# Patient Record
Sex: MALE | Race: BLACK OR AFRICAN AMERICAN | NOT HISPANIC OR LATINO | URBAN - METROPOLITAN AREA
[De-identification: names, ages, dates, MRNs, and addresses within clinical notes are randomized per-mention and may not be internally consistent; named-entity substitution may affect disease eponyms.]

---

## 2017-08-31 ENCOUNTER — OUTPATIENT (OUTPATIENT)
Dept: OUTPATIENT SERVICES | Facility: HOSPITAL | Age: 77
LOS: 1 days | Discharge: HOME | End: 2017-08-31

## 2017-09-07 DIAGNOSIS — I10 ESSENTIAL (PRIMARY) HYPERTENSION: ICD-10-CM

## 2017-09-07 DIAGNOSIS — H27.121 ANTERIOR DISLOCATION OF LENS, RIGHT EYE: ICD-10-CM

## 2017-09-07 DIAGNOSIS — M19.90 UNSPECIFIED OSTEOARTHRITIS, UNSPECIFIED SITE: ICD-10-CM

## 2017-09-07 DIAGNOSIS — N40.0 BENIGN PROSTATIC HYPERPLASIA WITHOUT LOWER URINARY TRACT SYMPTOMS: ICD-10-CM

## 2023-07-08 ENCOUNTER — INPATIENT (INPATIENT)
Facility: HOSPITAL | Age: 83
LOS: 5 days | Discharge: NURSING, REHAB OR LONG-TERM CARE | DRG: 377 | End: 2023-07-14
Attending: INTERNAL MEDICINE | Admitting: INTERNAL MEDICINE
Payer: MEDICARE

## 2023-07-08 VITALS
DIASTOLIC BLOOD PRESSURE: 57 MMHG | OXYGEN SATURATION: 96 % | HEIGHT: 68 IN | RESPIRATION RATE: 17 BRPM | WEIGHT: 184.97 LBS | HEART RATE: 85 BPM | SYSTOLIC BLOOD PRESSURE: 91 MMHG | TEMPERATURE: 100 F

## 2023-07-08 DIAGNOSIS — I10 ESSENTIAL (PRIMARY) HYPERTENSION: ICD-10-CM

## 2023-07-08 DIAGNOSIS — N40.0 BENIGN PROSTATIC HYPERPLASIA WITHOUT LOWER URINARY TRACT SYMPTOMS: ICD-10-CM

## 2023-07-08 DIAGNOSIS — H40.9 UNSPECIFIED GLAUCOMA: ICD-10-CM

## 2023-07-08 DIAGNOSIS — E53.8 DEFICIENCY OF OTHER SPECIFIED B GROUP VITAMINS: ICD-10-CM

## 2023-07-08 DIAGNOSIS — I25.10 ATHEROSCLEROTIC HEART DISEASE OF NATIVE CORONARY ARTERY WITHOUT ANGINA PECTORIS: ICD-10-CM

## 2023-07-08 DIAGNOSIS — Z29.9 ENCOUNTER FOR PROPHYLACTIC MEASURES, UNSPECIFIED: ICD-10-CM

## 2023-07-08 DIAGNOSIS — D64.9 ANEMIA, UNSPECIFIED: ICD-10-CM

## 2023-07-08 DIAGNOSIS — E11.9 TYPE 2 DIABETES MELLITUS WITHOUT COMPLICATIONS: ICD-10-CM

## 2023-07-08 DIAGNOSIS — K92.2 GASTROINTESTINAL HEMORRHAGE, UNSPECIFIED: ICD-10-CM

## 2023-07-08 DIAGNOSIS — E78.5 HYPERLIPIDEMIA, UNSPECIFIED: ICD-10-CM

## 2023-07-08 LAB
ALBUMIN SERPL ELPH-MCNC: 2.9 G/DL — LOW (ref 3.5–5)
ALP SERPL-CCNC: 92 U/L — SIGNIFICANT CHANGE UP (ref 40–120)
ALT FLD-CCNC: 27 U/L DA — SIGNIFICANT CHANGE UP (ref 10–60)
ANION GAP SERPL CALC-SCNC: 7 MMOL/L — SIGNIFICANT CHANGE UP (ref 5–17)
APTT BLD: 27.4 SEC — LOW (ref 27.5–35.5)
AST SERPL-CCNC: 29 U/L — SIGNIFICANT CHANGE UP (ref 10–40)
BASOPHILS # BLD AUTO: 0.03 K/UL — SIGNIFICANT CHANGE UP (ref 0–0.2)
BASOPHILS NFR BLD AUTO: 0.4 % — SIGNIFICANT CHANGE UP (ref 0–2)
BILIRUB SERPL-MCNC: 0.3 MG/DL — SIGNIFICANT CHANGE UP (ref 0.2–1.2)
BLD GP AB SCN SERPL QL: SIGNIFICANT CHANGE UP
BUN SERPL-MCNC: 24 MG/DL — HIGH (ref 7–18)
CALCIUM SERPL-MCNC: 9.3 MG/DL — SIGNIFICANT CHANGE UP (ref 8.4–10.5)
CHLORIDE SERPL-SCNC: 100 MMOL/L — SIGNIFICANT CHANGE UP (ref 96–108)
CO2 SERPL-SCNC: 29 MMOL/L — SIGNIFICANT CHANGE UP (ref 22–31)
CREAT SERPL-MCNC: 0.81 MG/DL — SIGNIFICANT CHANGE UP (ref 0.5–1.3)
EGFR: 87 ML/MIN/1.73M2 — SIGNIFICANT CHANGE UP
EOSINOPHIL # BLD AUTO: 0.11 K/UL — SIGNIFICANT CHANGE UP (ref 0–0.5)
EOSINOPHIL NFR BLD AUTO: 1.5 % — SIGNIFICANT CHANGE UP (ref 0–6)
GLUCOSE BLDC GLUCOMTR-MCNC: 119 MG/DL — HIGH (ref 70–99)
GLUCOSE BLDC GLUCOMTR-MCNC: 134 MG/DL — HIGH (ref 70–99)
GLUCOSE SERPL-MCNC: 109 MG/DL — HIGH (ref 70–99)
HCT VFR BLD CALC: 26 % — LOW (ref 39–50)
HCT VFR BLD CALC: 26.2 % — LOW (ref 39–50)
HCT VFR BLD CALC: 26.8 % — LOW (ref 39–50)
HGB BLD-MCNC: 8 G/DL — LOW (ref 13–17)
HGB BLD-MCNC: 8.2 G/DL — LOW (ref 13–17)
HGB BLD-MCNC: 8.4 G/DL — LOW (ref 13–17)
IMM GRANULOCYTES NFR BLD AUTO: 0.6 % — SIGNIFICANT CHANGE UP (ref 0–0.9)
INR BLD: 0.98 RATIO — SIGNIFICANT CHANGE UP (ref 0.88–1.16)
LYMPHOCYTES # BLD AUTO: 1.59 K/UL — SIGNIFICANT CHANGE UP (ref 1–3.3)
LYMPHOCYTES # BLD AUTO: 22 % — SIGNIFICANT CHANGE UP (ref 13–44)
MCHC RBC-ENTMCNC: 27.5 PG — SIGNIFICANT CHANGE UP (ref 27–34)
MCHC RBC-ENTMCNC: 27.8 PG — SIGNIFICANT CHANGE UP (ref 27–34)
MCHC RBC-ENTMCNC: 28.3 PG — SIGNIFICANT CHANGE UP (ref 27–34)
MCHC RBC-ENTMCNC: 30.8 GM/DL — LOW (ref 32–36)
MCHC RBC-ENTMCNC: 31.3 GM/DL — LOW (ref 32–36)
MCHC RBC-ENTMCNC: 31.3 GM/DL — LOW (ref 32–36)
MCV RBC AUTO: 87.6 FL — SIGNIFICANT CHANGE UP (ref 80–100)
MCV RBC AUTO: 90.3 FL — SIGNIFICANT CHANGE UP (ref 80–100)
MCV RBC AUTO: 90.3 FL — SIGNIFICANT CHANGE UP (ref 80–100)
MONOCYTES # BLD AUTO: 0.79 K/UL — SIGNIFICANT CHANGE UP (ref 0–0.9)
MONOCYTES NFR BLD AUTO: 10.9 % — SIGNIFICANT CHANGE UP (ref 2–14)
NEUTROPHILS # BLD AUTO: 4.67 K/UL — SIGNIFICANT CHANGE UP (ref 1.8–7.4)
NEUTROPHILS NFR BLD AUTO: 64.6 % — SIGNIFICANT CHANGE UP (ref 43–77)
NRBC # BLD: 0 /100 WBCS — SIGNIFICANT CHANGE UP (ref 0–0)
PLATELET # BLD AUTO: 385 K/UL — SIGNIFICANT CHANGE UP (ref 150–400)
PLATELET # BLD AUTO: 405 K/UL — HIGH (ref 150–400)
PLATELET # BLD AUTO: 447 K/UL — HIGH (ref 150–400)
POTASSIUM SERPL-MCNC: 5.1 MMOL/L — SIGNIFICANT CHANGE UP (ref 3.5–5.3)
POTASSIUM SERPL-SCNC: 5.1 MMOL/L — SIGNIFICANT CHANGE UP (ref 3.5–5.3)
PROT SERPL-MCNC: 6.5 G/DL — SIGNIFICANT CHANGE UP (ref 6–8.3)
PROTHROM AB SERPL-ACNC: 11.7 SEC — SIGNIFICANT CHANGE UP (ref 10.5–13.4)
RBC # BLD: 2.88 M/UL — LOW (ref 4.2–5.8)
RBC # BLD: 2.9 M/UL — LOW (ref 4.2–5.8)
RBC # BLD: 3.06 M/UL — LOW (ref 4.2–5.8)
RBC # FLD: 13.7 % — SIGNIFICANT CHANGE UP (ref 10.3–14.5)
RBC # FLD: 13.8 % — SIGNIFICANT CHANGE UP (ref 10.3–14.5)
RBC # FLD: 14.7 % — HIGH (ref 10.3–14.5)
SODIUM SERPL-SCNC: 136 MMOL/L — SIGNIFICANT CHANGE UP (ref 135–145)
WBC # BLD: 6.87 K/UL — SIGNIFICANT CHANGE UP (ref 3.8–10.5)
WBC # BLD: 7.16 K/UL — SIGNIFICANT CHANGE UP (ref 3.8–10.5)
WBC # BLD: 7.23 K/UL — SIGNIFICANT CHANGE UP (ref 3.8–10.5)
WBC # FLD AUTO: 6.87 K/UL — SIGNIFICANT CHANGE UP (ref 3.8–10.5)
WBC # FLD AUTO: 7.16 K/UL — SIGNIFICANT CHANGE UP (ref 3.8–10.5)
WBC # FLD AUTO: 7.23 K/UL — SIGNIFICANT CHANGE UP (ref 3.8–10.5)

## 2023-07-08 PROCEDURE — 99285 EMERGENCY DEPT VISIT HI MDM: CPT

## 2023-07-08 RX ORDER — ACETAMINOPHEN 500 MG
650 TABLET ORAL EVERY 6 HOURS
Refills: 0 | Status: DISCONTINUED | OUTPATIENT
Start: 2023-07-08 | End: 2023-07-14

## 2023-07-08 RX ORDER — SODIUM CHLORIDE 9 MG/ML
1000 INJECTION, SOLUTION INTRAVENOUS
Refills: 0 | Status: DISCONTINUED | OUTPATIENT
Start: 2023-07-08 | End: 2023-07-08

## 2023-07-08 RX ORDER — ATORVASTATIN CALCIUM 80 MG/1
80 TABLET, FILM COATED ORAL AT BEDTIME
Refills: 0 | Status: DISCONTINUED | OUTPATIENT
Start: 2023-07-08 | End: 2023-07-10

## 2023-07-08 RX ORDER — SODIUM CHLORIDE 9 MG/ML
1000 INJECTION, SOLUTION INTRAVENOUS
Refills: 0 | Status: DISCONTINUED | OUTPATIENT
Start: 2023-07-08 | End: 2023-07-09

## 2023-07-08 RX ORDER — ASPIRIN/CALCIUM CARB/MAGNESIUM 324 MG
81 TABLET ORAL DAILY
Refills: 0 | Status: DISCONTINUED | OUTPATIENT
Start: 2023-07-08 | End: 2023-07-14

## 2023-07-08 RX ORDER — PANTOPRAZOLE SODIUM 20 MG/1
40 TABLET, DELAYED RELEASE ORAL EVERY 12 HOURS
Refills: 0 | Status: DISCONTINUED | OUTPATIENT
Start: 2023-07-08 | End: 2023-07-14

## 2023-07-08 RX ORDER — SODIUM CHLORIDE 9 MG/ML
1000 INJECTION INTRAMUSCULAR; INTRAVENOUS; SUBCUTANEOUS ONCE
Refills: 0 | Status: COMPLETED | OUTPATIENT
Start: 2023-07-08 | End: 2023-07-08

## 2023-07-08 RX ORDER — FINASTERIDE 5 MG/1
5 TABLET, FILM COATED ORAL DAILY
Refills: 0 | Status: DISCONTINUED | OUTPATIENT
Start: 2023-07-08 | End: 2023-07-14

## 2023-07-08 RX ORDER — INSULIN LISPRO 100/ML
VIAL (ML) SUBCUTANEOUS EVERY 6 HOURS
Refills: 0 | Status: DISCONTINUED | OUTPATIENT
Start: 2023-07-08 | End: 2023-07-09

## 2023-07-08 RX ORDER — PANTOPRAZOLE SODIUM 20 MG/1
8 TABLET, DELAYED RELEASE ORAL
Qty: 80 | Refills: 0 | Status: DISCONTINUED | OUTPATIENT
Start: 2023-07-08 | End: 2023-07-08

## 2023-07-08 RX ORDER — PANTOPRAZOLE SODIUM 20 MG/1
80 TABLET, DELAYED RELEASE ORAL ONCE
Refills: 0 | Status: COMPLETED | OUTPATIENT
Start: 2023-07-08 | End: 2023-07-08

## 2023-07-08 RX ORDER — TAMSULOSIN HYDROCHLORIDE 0.4 MG/1
0.4 CAPSULE ORAL AT BEDTIME
Refills: 0 | Status: DISCONTINUED | OUTPATIENT
Start: 2023-07-08 | End: 2023-07-14

## 2023-07-08 RX ORDER — BRIMONIDINE TARTRATE 2 MG/MG
1 SOLUTION/ DROPS OPHTHALMIC THREE TIMES A DAY
Refills: 0 | Status: DISCONTINUED | OUTPATIENT
Start: 2023-07-08 | End: 2023-07-14

## 2023-07-08 RX ORDER — FOLIC ACID 0.8 MG
1 TABLET ORAL DAILY
Refills: 0 | Status: DISCONTINUED | OUTPATIENT
Start: 2023-07-08 | End: 2023-07-14

## 2023-07-08 RX ADMIN — TAMSULOSIN HYDROCHLORIDE 0.4 MILLIGRAM(S): 0.4 CAPSULE ORAL at 22:16

## 2023-07-08 RX ADMIN — BRIMONIDINE TARTRATE 1 DROP(S): 2 SOLUTION/ DROPS OPHTHALMIC at 22:16

## 2023-07-08 RX ADMIN — ATORVASTATIN CALCIUM 80 MILLIGRAM(S): 80 TABLET, FILM COATED ORAL at 22:16

## 2023-07-08 RX ADMIN — PANTOPRAZOLE SODIUM 40 MILLIGRAM(S): 20 TABLET, DELAYED RELEASE ORAL at 18:41

## 2023-07-08 RX ADMIN — SODIUM CHLORIDE 100 MILLILITER(S): 9 INJECTION, SOLUTION INTRAVENOUS at 22:16

## 2023-07-08 RX ADMIN — SODIUM CHLORIDE 1000 MILLILITER(S): 9 INJECTION INTRAMUSCULAR; INTRAVENOUS; SUBCUTANEOUS at 11:57

## 2023-07-08 RX ADMIN — SODIUM CHLORIDE 100 MILLILITER(S): 9 INJECTION, SOLUTION INTRAVENOUS at 19:05

## 2023-07-08 RX ADMIN — PANTOPRAZOLE SODIUM 80 MILLIGRAM(S): 20 TABLET, DELAYED RELEASE ORAL at 11:57

## 2023-07-08 RX ADMIN — PANTOPRAZOLE SODIUM 10 MG/HR: 20 TABLET, DELAYED RELEASE ORAL at 12:29

## 2023-07-08 NOTE — H&P ADULT - NSHPREVIEWOFSYSTEMS_GEN_ALL_CORE
CONSTITUTIONAL: No fever, weight loss, or fatigue  EYES: No eye pain, visual disturbances, or discharge  ENT:  No difficulty hearing, tinnitus, vertigo; No sinus or throat pain  NECK: No pain or stiffness  RESPIRATORY: No cough, wheezing, chills or hemoptysis; No Shortness of Breath  CARDIOVASCULAR: No chest pain, palpitations, passing out, dizziness, or leg swelling  GASTROINTESTINAL: No abdominal or epigastric pain. No nausea, vomiting, or hematemesis; No diarrhea or constipation.+ melena and hematochezia, BRBPR  GENITOURINARY: No dysuria, frequency, hematuria, or incontinence  NEUROLOGICAL: No headaches, memory loss, loss of strength, numbness, or tremors  SKIN: No itching, burning, rashes, or lesions   LYMPH Nodes: No enlarged glands  ENDOCRINE: No heat or cold intolerance; No hair loss  MUSCULOSKELETAL: No joint pain or swelling; No muscle, back, No extremity pain  PSYCHIATRIC: No depression, anxiety, mood swings, or difficulty sleeping  HEME/LYMPH: No easy bruising, or bleeding gums  ALLERGY AND IMMUNOLOGIC: No hives or eczema

## 2023-07-08 NOTE — H&P ADULT - VTE RISK ASSESSMENT
----- Message from Dain Wood MD sent at 11/19/2021  7:08 AM CST -----  Let him know the labs show that a1c is 6.3, last time 6.4 so slightly improved.   <<--- Click to launch

## 2023-07-08 NOTE — H&P ADULT - ATTENDING COMMENTS
PATIENT IS SEEN AND EXAMINED        # GI BLEED , NORMOCYTIC ANEMIA - OBSERVE SERIAL CBC  , CONTINUE PROTONIX   - GI CONSULT FOR EGD & COLONOSCOPY     # DC TELEMETRY    # RESUME DIET     # AT BOOTS     DR. EVAN BIGGS

## 2023-07-08 NOTE — ED ADULT TRIAGE NOTE - CHIEF COMPLAINT QUOTE
Pt sent from [facility] c/o black wade stool since Monday and got worsen x yesterday.

## 2023-07-08 NOTE — ED PROVIDER NOTE - CLINICAL SUMMARY MEDICAL DECISION MAKING FREE TEXT BOX
Pyelonephritis is an infection that occurs in the kidney. The kidneys are the organs that filter a person's blood and move waste out of the bloodstream and into the urine. Urine passes from the kidneys, through tubes called ureters, and into the bladder. There are two main types of pyelonephritis:Infections that come on quickly without any warning (acute pyelonephritis).Infections that last for a long period of time (chronic pyelonephritis).

## 2023-07-08 NOTE — ED PROVIDER NOTE - OBJECTIVE STATEMENT
82-year-old male presents with rectal bleeding.  Patient states his stool has been maroon for the last 3 days.  He denies dizziness lightheadedness presyncope chest pain or shortness of breath.  He has never had this before.

## 2023-07-08 NOTE — H&P ADULT - ASSESSMENT
81 yo M, from [facility] ambulates with a walker and cane at baseline, w PMH of HTN, HLD, DM, BPH, CAD, folate deficiency, glaucoma p/w rectal bleeding since 07/03/2023. He states that he has been having BRBPR since Monday with last episode the day of admission in the morning. Pt is admitted for GIB.

## 2023-07-08 NOTE — H&P ADULT - PROBLEM SELECTOR PLAN 1
- p/w dark black tarry stool and BRBPR since 07/03  - no hx NSAIDs abuse  - p/w Hb  8.2 unknown baseline,   - s/p 1 LNS, protonix IVP and drip, and 1 U pRBC in ED  - no Hypotension or any signs of hemorrhagic shock   - Type and Screen  - NPO  - c/w IV Protonix BID   - f/u CBC q12hrs   - f/u Anemia panel ,  LDH , TIBC , Total Iron , Transferrin , Ferritin , Reticulocytes   - Transfuse as needed if hg drops <7    Consider GI consult on Monday or transfer out if pt is hemodynamically unstable or bleeding persists

## 2023-07-08 NOTE — ED ADULT NURSE NOTE - NSFALLHARMRISKINTERV_ED_ALL_ED

## 2023-07-08 NOTE — H&P ADULT - HISTORY OF PRESENT ILLNESS
83 yo M, from [facility] ambulates with a walker and cane at baseline, w PMH of HTN, HLD, DM, BPH, CAD, folate deficiency, glaucoma p/w rectal bleeding since 07/03/2023. He states that he has been having BRBPR since Monday with last episode the day of admission in the morning. Pt denies any prior episodes of GIB. He also denies any prior colonoscopies, or EGDs. He denies any family hx of colon cancer. He denies any CP, SOB, Palpitation, NV,D,Abd pain. He states that beside the lower GIB he has no other complains.

## 2023-07-08 NOTE — H&P ADULT - PROBLEM SELECTOR PLAN 3
h/o HTN on losartan/ HCTZ 100/12.5 qd  Monitor BP  hold home meds in the setting of GIB  restart medications if pt is hypertensive

## 2023-07-08 NOTE — H&P ADULT - NSICDXPASTMEDICALHX_GEN_ALL_CORE_FT
PAST MEDICAL HISTORY:  BPH (benign prostatic hyperplasia)     CAD (coronary artery disease)     DM (diabetes mellitus)     Folate deficiency     Glaucoma     HLD (hyperlipidemia)     HTN (hypertension)

## 2023-07-08 NOTE — H&P ADULT - NSHPPHYSICALEXAM_GEN_ALL_CORE
GENERAL: NAD, elderly male  HEAD:  Atraumatic, Normocephalic  EYES: EOMI, PERRLA, conjunctiva and sclera clear  ENMT: No tonsillar erythema, exudates, or enlargement; Moist mucous membranes, Good dentition, No lesions  NECK: Supple, normal appearance, No JVD; Normal thyroid; Trachea midline  NERVOUS SYSTEM:  Alert & Oriented X3,  Motor Strength 5/5 B/L upper and lower extremities, sensation intact  CHEST/LUNG: Lungs clear to auscultation bilaterally, No rales, rhonchi, wheezing   HEART: Regular rate and rhythm; No murmurs, rubs, or gallops  ABDOMEN: Soft, Nontender, Nondistended; Bowel sounds present  EXTREMITIES:  2+ Peripheral Pulses, No clubbing, cyanosis, or edema  LYMPH: No lymphadenopathy noted  SKIN: No rashes or lesions;  Good capillary refill

## 2023-07-08 NOTE — ED ADULT NURSE NOTE - NS ED NURSE TRANSPORT WITH
Cardiac Monitor/Defib/ACLS/Rescue Kit/O2/BVM/pulse ox/Blood Products Transporter & RN/Cardiac Monitor/Defib/ACLS/Rescue Kit/O2/BVM/pulse ox/Blood Products

## 2023-07-08 NOTE — ED PROVIDER NOTE - NS ED ROS FT
Pt denies fevers, chills  nausea, vomiting,   chest pain, palpitations  shortness of breath, orthopnea  abdominal pain,   dysuria, hematuria   numbness, weakness, saddle anesthesia  rash  enlarged lymph nodes

## 2023-07-08 NOTE — H&P ADULT - PROBLEM SELECTOR PLAN 5
- pt takes rosuvastatin 20 mg at home  - c/w atorvastatin 80 mg   - f/u lipid profile  - Dash Diet when not NPO

## 2023-07-08 NOTE — ED PROVIDER NOTE - PHYSICAL EXAMINATION
General: Well appearing, no acute distress, appears stated age  HEENT: normocephalic, atraumatic   Respiratory: normal work of breathing  MSK: no swelling or tenderness of lower extremities, moving all extremities spontaneously   Skin: warm, dry  Neuro: A&Ox3, cranial nerves II-XII intact, 5/5 strength in all extremities, no sensory deficits, normal gait   Psych: appropriate affect  ABD: soft, nontender, nondistended, no guarding, no rebound, no CVA tenderness, + maroon stool

## 2023-07-08 NOTE — ED ADULT NURSE NOTE - OBJECTIVE STATEMENT
Patient came to the ED a/o x 3 ambulates c/o rectal bleed x Monday. + black tarry stool and worse today

## 2023-07-08 NOTE — PATIENT PROFILE ADULT - FALL HARM RISK - HARM RISK INTERVENTIONS

## 2023-07-08 NOTE — H&P ADULT - PROBLEM SELECTOR PLAN 6
- Patient takes Metformin at home  - will hold home medications  - will start sliding scale  - f/u HgA1c  - diabetic diet when not NPO

## 2023-07-08 NOTE — H&P ADULT - NSHPSOCIALHISTORY_GEN_ALL_CORE
Denies smoking, alcohol, illicit drug use. Lives at nursing home and ambulates with a walker and cane at baseline

## 2023-07-09 LAB
A1C WITH ESTIMATED AVERAGE GLUCOSE RESULT: 6.8 % — HIGH (ref 4–5.6)
ALBUMIN SERPL ELPH-MCNC: 2.7 G/DL — LOW (ref 3.5–5)
ALP SERPL-CCNC: 82 U/L — SIGNIFICANT CHANGE UP (ref 40–120)
ALT FLD-CCNC: 24 U/L DA — SIGNIFICANT CHANGE UP (ref 10–60)
ANION GAP SERPL CALC-SCNC: 6 MMOL/L — SIGNIFICANT CHANGE UP (ref 5–17)
AST SERPL-CCNC: 23 U/L — SIGNIFICANT CHANGE UP (ref 10–40)
BASOPHILS # BLD AUTO: 0.03 K/UL — SIGNIFICANT CHANGE UP (ref 0–0.2)
BASOPHILS NFR BLD AUTO: 0.4 % — SIGNIFICANT CHANGE UP (ref 0–2)
BILIRUB SERPL-MCNC: 0.4 MG/DL — SIGNIFICANT CHANGE UP (ref 0.2–1.2)
BUN SERPL-MCNC: 16 MG/DL — SIGNIFICANT CHANGE UP (ref 7–18)
CALCIUM SERPL-MCNC: 8.9 MG/DL — SIGNIFICANT CHANGE UP (ref 8.4–10.5)
CHLORIDE SERPL-SCNC: 104 MMOL/L — SIGNIFICANT CHANGE UP (ref 96–108)
CHOLEST SERPL-MCNC: 96 MG/DL — SIGNIFICANT CHANGE UP
CO2 SERPL-SCNC: 30 MMOL/L — SIGNIFICANT CHANGE UP (ref 22–31)
CREAT SERPL-MCNC: 0.72 MG/DL — SIGNIFICANT CHANGE UP (ref 0.5–1.3)
EGFR: 91 ML/MIN/1.73M2 — SIGNIFICANT CHANGE UP
EOSINOPHIL # BLD AUTO: 0.2 K/UL — SIGNIFICANT CHANGE UP (ref 0–0.5)
EOSINOPHIL NFR BLD AUTO: 3 % — SIGNIFICANT CHANGE UP (ref 0–6)
ESTIMATED AVERAGE GLUCOSE: 148 MG/DL — HIGH (ref 68–114)
GLUCOSE BLDC GLUCOMTR-MCNC: 134 MG/DL — HIGH (ref 70–99)
GLUCOSE BLDC GLUCOMTR-MCNC: 135 MG/DL — HIGH (ref 70–99)
GLUCOSE BLDC GLUCOMTR-MCNC: 146 MG/DL — HIGH (ref 70–99)
GLUCOSE BLDC GLUCOMTR-MCNC: 146 MG/DL — HIGH (ref 70–99)
GLUCOSE SERPL-MCNC: 141 MG/DL — HIGH (ref 70–99)
HAPTOGLOB SERPL-MCNC: 127 MG/DL — SIGNIFICANT CHANGE UP (ref 34–200)
HCT VFR BLD CALC: 26.5 % — LOW (ref 39–50)
HCT VFR BLD CALC: 27.9 % — LOW (ref 39–50)
HDLC SERPL-MCNC: 41 MG/DL — SIGNIFICANT CHANGE UP
HGB BLD-MCNC: 8.5 G/DL — LOW (ref 13–17)
HGB BLD-MCNC: 8.7 G/DL — LOW (ref 13–17)
IMM GRANULOCYTES NFR BLD AUTO: 0.6 % — SIGNIFICANT CHANGE UP (ref 0–0.9)
IRON SATN MFR SERPL: 22 % — SIGNIFICANT CHANGE UP (ref 20–55)
IRON SATN MFR SERPL: 54 UG/DL — LOW (ref 65–170)
LDH SERPL L TO P-CCNC: 164 U/L — SIGNIFICANT CHANGE UP (ref 120–225)
LIPID PNL WITH DIRECT LDL SERPL: 36 MG/DL — SIGNIFICANT CHANGE UP
LYMPHOCYTES # BLD AUTO: 1.53 K/UL — SIGNIFICANT CHANGE UP (ref 1–3.3)
LYMPHOCYTES # BLD AUTO: 22.8 % — SIGNIFICANT CHANGE UP (ref 13–44)
MAGNESIUM SERPL-MCNC: 1.6 MG/DL — SIGNIFICANT CHANGE UP (ref 1.6–2.6)
MCHC RBC-ENTMCNC: 27.6 PG — SIGNIFICANT CHANGE UP (ref 27–34)
MCHC RBC-ENTMCNC: 28 PG — SIGNIFICANT CHANGE UP (ref 27–34)
MCHC RBC-ENTMCNC: 31.2 GM/DL — LOW (ref 32–36)
MCHC RBC-ENTMCNC: 32.1 GM/DL — SIGNIFICANT CHANGE UP (ref 32–36)
MCV RBC AUTO: 86 FL — SIGNIFICANT CHANGE UP (ref 80–100)
MCV RBC AUTO: 89.7 FL — SIGNIFICANT CHANGE UP (ref 80–100)
MONOCYTES # BLD AUTO: 0.61 K/UL — SIGNIFICANT CHANGE UP (ref 0–0.9)
MONOCYTES NFR BLD AUTO: 9.1 % — SIGNIFICANT CHANGE UP (ref 2–14)
NEUTROPHILS # BLD AUTO: 4.29 K/UL — SIGNIFICANT CHANGE UP (ref 1.8–7.4)
NEUTROPHILS NFR BLD AUTO: 64.1 % — SIGNIFICANT CHANGE UP (ref 43–77)
NON HDL CHOLESTEROL: 55 MG/DL — SIGNIFICANT CHANGE UP
NRBC # BLD: 0 /100 WBCS — SIGNIFICANT CHANGE UP (ref 0–0)
NRBC # BLD: 0 /100 WBCS — SIGNIFICANT CHANGE UP (ref 0–0)
PHOSPHATE SERPL-MCNC: 3 MG/DL — SIGNIFICANT CHANGE UP (ref 2.5–4.5)
PLATELET # BLD AUTO: 381 K/UL — SIGNIFICANT CHANGE UP (ref 150–400)
PLATELET # BLD AUTO: 418 K/UL — HIGH (ref 150–400)
POTASSIUM SERPL-MCNC: 4.2 MMOL/L — SIGNIFICANT CHANGE UP (ref 3.5–5.3)
POTASSIUM SERPL-SCNC: 4.2 MMOL/L — SIGNIFICANT CHANGE UP (ref 3.5–5.3)
PROT SERPL-MCNC: 5.7 G/DL — LOW (ref 6–8.3)
RBC # BLD: 3.08 M/UL — LOW (ref 4.2–5.8)
RBC # BLD: 3.08 M/UL — LOW (ref 4.2–5.8)
RBC # BLD: 3.11 M/UL — LOW (ref 4.2–5.8)
RBC # FLD: 15.2 % — HIGH (ref 10.3–14.5)
RBC # FLD: 15.3 % — HIGH (ref 10.3–14.5)
RETICS #: 76.4 K/UL — SIGNIFICANT CHANGE UP (ref 25–125)
RETICS/RBC NFR: 2.5 % — SIGNIFICANT CHANGE UP (ref 0.5–2.5)
SODIUM SERPL-SCNC: 140 MMOL/L — SIGNIFICANT CHANGE UP (ref 135–145)
TIBC SERPL-MCNC: 251 UG/DL — SIGNIFICANT CHANGE UP (ref 250–450)
TRIGL SERPL-MCNC: 93 MG/DL — SIGNIFICANT CHANGE UP
UIBC SERPL-MCNC: 197 UG/DL — SIGNIFICANT CHANGE UP (ref 110–370)
WBC # BLD: 6.7 K/UL — SIGNIFICANT CHANGE UP (ref 3.8–10.5)
WBC # BLD: 8.69 K/UL — SIGNIFICANT CHANGE UP (ref 3.8–10.5)
WBC # FLD AUTO: 6.7 K/UL — SIGNIFICANT CHANGE UP (ref 3.8–10.5)
WBC # FLD AUTO: 8.69 K/UL — SIGNIFICANT CHANGE UP (ref 3.8–10.5)

## 2023-07-09 RX ORDER — SODIUM CHLORIDE 9 MG/ML
1000 INJECTION, SOLUTION INTRAVENOUS
Refills: 0 | Status: DISCONTINUED | OUTPATIENT
Start: 2023-07-09 | End: 2023-07-11

## 2023-07-09 RX ORDER — INSULIN LISPRO 100/ML
VIAL (ML) SUBCUTANEOUS EVERY 6 HOURS
Refills: 0 | Status: DISCONTINUED | OUTPATIENT
Start: 2023-07-09 | End: 2023-07-14

## 2023-07-09 RX ORDER — INSULIN LISPRO 100/ML
VIAL (ML) SUBCUTANEOUS
Refills: 0 | Status: DISCONTINUED | OUTPATIENT
Start: 2023-07-09 | End: 2023-07-09

## 2023-07-09 RX ORDER — INSULIN LISPRO 100/ML
VIAL (ML) SUBCUTANEOUS AT BEDTIME
Refills: 0 | Status: DISCONTINUED | OUTPATIENT
Start: 2023-07-09 | End: 2023-07-14

## 2023-07-09 RX ADMIN — BRIMONIDINE TARTRATE 1 DROP(S): 2 SOLUTION/ DROPS OPHTHALMIC at 14:27

## 2023-07-09 RX ADMIN — Medication 1 MILLIGRAM(S): at 11:42

## 2023-07-09 RX ADMIN — SODIUM CHLORIDE 100 MILLILITER(S): 9 INJECTION, SOLUTION INTRAVENOUS at 05:59

## 2023-07-09 RX ADMIN — BRIMONIDINE TARTRATE 1 DROP(S): 2 SOLUTION/ DROPS OPHTHALMIC at 06:03

## 2023-07-09 RX ADMIN — Medication 81 MILLIGRAM(S): at 11:42

## 2023-07-09 RX ADMIN — TAMSULOSIN HYDROCHLORIDE 0.4 MILLIGRAM(S): 0.4 CAPSULE ORAL at 22:31

## 2023-07-09 RX ADMIN — BRIMONIDINE TARTRATE 1 DROP(S): 2 SOLUTION/ DROPS OPHTHALMIC at 22:32

## 2023-07-09 RX ADMIN — SODIUM CHLORIDE 100 MILLILITER(S): 9 INJECTION, SOLUTION INTRAVENOUS at 22:30

## 2023-07-09 RX ADMIN — PANTOPRAZOLE SODIUM 40 MILLIGRAM(S): 20 TABLET, DELAYED RELEASE ORAL at 17:56

## 2023-07-09 RX ADMIN — SODIUM CHLORIDE 100 MILLILITER(S): 9 INJECTION, SOLUTION INTRAVENOUS at 14:27

## 2023-07-09 RX ADMIN — ATORVASTATIN CALCIUM 80 MILLIGRAM(S): 80 TABLET, FILM COATED ORAL at 22:31

## 2023-07-09 RX ADMIN — FINASTERIDE 5 MILLIGRAM(S): 5 TABLET, FILM COATED ORAL at 11:42

## 2023-07-09 RX ADMIN — PANTOPRAZOLE SODIUM 40 MILLIGRAM(S): 20 TABLET, DELAYED RELEASE ORAL at 05:59

## 2023-07-09 NOTE — CHART NOTE - NSCHARTNOTEFT_GEN_A_CORE
Called to bedside by RN.    PCA was cleaning patient up after a bowel movement and noticed metallic smell and dark tarry stools.  Mild bright red blood.    Patient denies pain/discomfort.    BP at bedside 130/70's.    ICU Vital Signs Last 24 Hrs  T(C): 36.3 (09 Jul 2023 17:01), Max: 37.1 (08 Jul 2023 23:52)  T(F): 97.3 (09 Jul 2023 17:01), Max: 98.8 (08 Jul 2023 23:52)  HR: 70 (09 Jul 2023 17:01) (70 - 88)  BP: 136/74 (09 Jul 2023 17:01) (95/60 - 136/74)  RR: 17 (09 Jul 2023 17:01) (17 - 19)  SpO2: 99% (09 Jul 2023 17:01) (93% - 99%)    O2 Parameters below as of 09 Jul 2023 17:01  Patient On (Oxygen Delivery Method): room air    1) GI Bleed: Will check CBC at 9pm. may still be hemorrhoids as suspected earlier in day, could be old blood (dark tarry stools)  -If has more bleeding then consult ICU or transfer as there is no GI on call on weekend.  -NPO with meds  -Already on protonix IV  -re-instated IV fluids  -accuchecks q6hr  -Notified Dr. Hampton.    Joseph Nickerson NP

## 2023-07-09 NOTE — PROGRESS NOTE ADULT - SUBJECTIVE AND OBJECTIVE BOX
PATIENT SEEN AND EXAMINED ON :- 7/9/23  DATE OF SERVICE:    7/9/23         Interim events noted,Labs ,Radiological studies and Cardiology tests reviewed .    MR#[id number]  PATIENT NAME:-KIYA HUMPHRIES           [facility] COURSE: HPI:  81 yo M, from [place] amb[place] with a walker and cane at baseline, w PMH of HTN, HLD, DM, BPH, CAD, folate deficiency, glaucoma p/w rectal bleeding since 07/03/2023. He states that he has been having BRBPR since Monday with last episode the day of admission in the morning. Pt denies any prior episodes of GIB. He also denies any prior colonoscopies, or EGDs. He denies any family hx of colon cancer. He denies any CP, SOB, Palpitation, NV,D,Abd pain. He states that beside the lower GIB he has no other complains.  (08 Jul 2023 15:05)      INTERIM EVENTS:Patient seen at bedside ,interim events noted.      PMH -reviewed admission note, no change since admission  HEART FAILURE: Acute[ ]Chronic[ ] Systolic[ ] Diastolic[ ] Combined Systolic and Diastolic[ ]  CAD[ ] CABG[ ] PCI[ ]  DEVICES[ ] PPM[ ] ICD[ ] ILR[ ]  ATRIAL FIBRILLATION[ ] Paroxysmal[ ] Permanent[ ] CHADS2-[  ]  SAHRA[ ] CKD1[ ] CKD2[ ] CKD3[ ] CKD4[ ] ESRD[ ]  COPD[ ] HTN[ ]   DM[ ] Type1[ ] Type 2[ ]   CVA[ ] Paresis[ ]    AMBULATION: Assisted[ ] Cane/walker[ ] Independent[ ]    MEDICATIONS  (STANDING):  aspirin  chewable 81 milliGRAM(s) Oral daily  atorvastatin 80 milliGRAM(s) Oral at bedtime  brimonidine 0.2% Ophthalmic Solution 1 Drop(s) Right EYE three times a day  dextrose 5% + lactated ringers. 1000 milliLiter(s) (100 mL/Hr) IV Continuous <Continuous>  finasteride 5 milliGRAM(s) Oral daily  folic acid 1 milliGRAM(s) Oral daily  insulin lispro (ADMELOG) corrective regimen sliding scale   SubCutaneous every 6 hours  insulin lispro (ADMELOG) corrective regimen sliding scale   SubCutaneous at bedtime  pantoprazole  Injectable 40 milliGRAM(s) IV Push every 12 hours  tamsulosin 0.4 milliGRAM(s) Oral at bedtime    MEDICATIONS  (PRN):  acetaminophen     Tablet .. 650 milliGRAM(s) Oral every 6 hours PRN Temp greater or equal to 38C (100.4F), Mild Pain (1 - 3)  aluminum hydroxide/magnesium hydroxide/simethicone Suspension 30 milliLiter(s) Oral every 4 hours PRN Dyspepsia            REVIEW OF SYSTEMS:  Constitutional: [ ] fever, [ ]weight loss,  [ ]fatigue [ ]weight gain  Eyes: [ ] visual changes  Respiratory: [ ]shortness of breath;  [ ] cough, [ ]wheezing, [ ]chills, [ ]hemoptysis  Cardiovascular: [ ] chest pain, [ ]palpitations, [ ]dizziness,  [ ]leg swelling[ ]orthopnea[ ]PND  Gastrointestinal: [ ] abdominal pain, [ ]nausea, [ ]vomiting,  [ ]diarrhea [ ]Constipation [ ]Melena  Genitourinary: [ ] dysuria, [ ] hematuria [ ]Hong  Neurologic: [ ] headaches [ ] tremors[ ]weakness [ ]Paralysis Right[ ] Left[ ]  Skin: [ ] itching, [ ]burning, [ ] rashes  Endocrine: [ ] heat or cold intolerance  Musculoskeletal: [ ] joint pain or swelling; [ ] muscle, back, or extremity pain  Psychiatric: [ ] depression, [ ]anxiety, [ ]mood swings, or [ ]difficulty sleeping  Hematologic: [ ] easy bruising, [ ] bleeding gums    [ ] All remaining systems negative except as per above.   [ ]Unable to obtain.  [x] No change in ROS since admission      Vital Signs Last 24 Hrs  T(C): 36.9 (10 Jul 2023 00:19), Max: 37 (09 Jul 2023 07:24)  T(F): 98.4 (10 Jul 2023 00:19), Max: 98.6 (09 Jul 2023 07:24)  HR: 98 (10 Jul 2023 00:19) (70 - 98)  BP: 144/86 (10 Jul 2023 00:19) (102/60 - 144/86)  BP(mean): --  RR: 18 (10 Jul 2023 00:19) (17 - 18)  SpO2: 99% (10 Jul 2023 00:19) (94% - 99%)    Parameters below as of 10 Jul 2023 00:19  Patient On (Oxygen Delivery Method): room air      I&O's Summary    08 Jul 2023 07:01  -  09 Jul 2023 07:00  --------------------------------------------------------  IN: 0 mL / OUT: 1000 mL / NET: -1000 mL    09 Jul 2023 07:01  -  10 Jul 2023 00:34  --------------------------------------------------------  IN: 1330 mL / OUT: 0 mL / NET: 1330 mL        PHYSICAL EXAM:  General: No acute distress BMI-  HEENT: EOMI, PERRL  Neck: Supple, [ ] JVD  Lungs: Equal air entry bilaterally; [ ] rales [ ] wheezing [ ] rhonchi  Heart: Regular rate and rhythm; [x ] murmur   2/6 [ x] systolic [ ] diastolic [ ] radiation[ ] rubs [ ]  gallops  Abdomen: Nontender, bowel sounds present  Extremities: No clubbing, cyanosis, [ ] edema [ ]Pulses  equal and intact  Nervous system:  Alert & Oriented X3, no focal deficits  Psychiatric: Normal affect  Skin: No rashes or lesions    LABS:  07-09    140  |  104  |  16  ----------------------------<  141<H>  4.2   |  30  |  0.72    Ca    8.9      09 Jul 2023 06:04  Phos  3.0     07-09  Mg     1.6     07-09    TPro  5.7<L>  /  Alb  2.7<L>  /  TBili  0.4  /  DBili  x   /  AST  23  /  ALT  24  /  AlkPhos  82  07-09    Creatinine Trend: 0.72<--, 0.81<--                        8.7    8.69  )-----------( 418      ( 09 Jul 2023 21:20 )             27.9     PT/INR - ( 08 Jul 2023 12:00 )   PT: 11.7 sec;   INR: 0.98 ratio         PTT - ( 08 Jul 2023 12:00 )  PTT:27.4 sec

## 2023-07-09 NOTE — PROGRESS NOTE ADULT - SUBJECTIVE AND OBJECTIVE BOX
Patient is a 82y old  Male who presents with a chief complaint of GIB (08 Jul 2023 15:05)    PATIENT IS SEEN AND EXAMINED IN MEDICAL FLOOR.    NADERT [    ]    ALESSANDRA [   ]      GT [   ]    ALLERGIES:  No Known Allergies      Daily     Daily     VITALS:    Vital Signs Last 24 Hrs  T(C): 36.9 (09 Jul 2023 20:01), Max: 37.1 (08 Jul 2023 23:52)  T(F): 98.4 (09 Jul 2023 20:01), Max: 98.8 (08 Jul 2023 23:52)  HR: 92 (09 Jul 2023 20:01) (70 - 92)  BP: 130/78 (09 Jul 2023 20:01) (95/60 - 136/74)  BP(mean): --  RR: 17 (09 Jul 2023 20:01) (17 - 18)  SpO2: 96% (09 Jul 2023 20:01) (94% - 99%)    Parameters below as of 09 Jul 2023 20:01  Patient On (Oxygen Delivery Method): room air        LABS:    CBC Full  -  ( 09 Jul 2023 06:04 )  WBC Count : 6.70 K/uL  RBC Count : 3.08 M/uL  Hemoglobin : 8.5 g/dL  Hematocrit : 26.5 %  Platelet Count - Automated : 381 K/uL  Mean Cell Volume : 86.0 fl  Mean Cell Hemoglobin : 27.6 pg  Mean Cell Hemoglobin Concentration : 32.1 gm/dL  Auto Neutrophil # : 4.29 K/uL  Auto Lymphocyte # : 1.53 K/uL  Auto Monocyte # : 0.61 K/uL  Auto Eosinophil # : 0.20 K/uL  Auto Basophil # : 0.03 K/uL  Auto Neutrophil % : 64.1 %  Auto Lymphocyte % : 22.8 %  Auto Monocyte % : 9.1 %  Auto Eosinophil % : 3.0 %  Auto Basophil % : 0.4 %    PT/INR - ( 08 Jul 2023 12:00 )   PT: 11.7 sec;   INR: 0.98 ratio         PTT - ( 08 Jul 2023 12:00 )  PTT:27.4 sec  07-09    140  |  104  |  16  ----------------------------<  141<H>  4.2   |  30  |  0.72    Ca    8.9      09 Jul 2023 06:04  Phos  3.0     07-09  Mg     1.6     07-09    TPro  5.7<L>  /  Alb  2.7<L>  /  TBili  0.4  /  DBili  x   /  AST  23  /  ALT  24  /  AlkPhos  82  07-09    CAPILLARY BLOOD GLUCOSE      POCT Blood Glucose.: 146 mg/dL (09 Jul 2023 19:54)  POCT Blood Glucose.: 135 mg/dL (09 Jul 2023 16:46)  POCT Blood Glucose.: 146 mg/dL (09 Jul 2023 11:15)  POCT Blood Glucose.: 134 mg/dL (09 Jul 2023 06:02)  POCT Blood Glucose.: 134 mg/dL (08 Jul 2023 23:58)        LIVER FUNCTIONS - ( 09 Jul 2023 06:04 )  Alb: 2.7 g/dL / Pro: 5.7 g/dL / ALK PHOS: 82 U/L / ALT: 24 U/L DA / AST: 23 U/L / GGT: x           Creatinine Trend: 0.72<--, 0.81<--  I&O's Summary    08 Jul 2023 07:01  -  09 Jul 2023 07:00  --------------------------------------------------------  IN: 0 mL / OUT: 1000 mL / NET: -1000 mL    09 Jul 2023 07:01  -  09 Jul 2023 21:10  --------------------------------------------------------  IN: 1330 mL / OUT: 0 mL / NET: 1330 mL                MEDICATIONS:    MEDICATIONS  (STANDING):  aspirin  chewable 81 milliGRAM(s) Oral daily  atorvastatin 80 milliGRAM(s) Oral at bedtime  brimonidine 0.2% Ophthalmic Solution 1 Drop(s) Right EYE three times a day  dextrose 5% + lactated ringers. 1000 milliLiter(s) (100 mL/Hr) IV Continuous <Continuous>  finasteride 5 milliGRAM(s) Oral daily  folic acid 1 milliGRAM(s) Oral daily  insulin lispro (ADMELOG) corrective regimen sliding scale   SubCutaneous every 6 hours  insulin lispro (ADMELOG) corrective regimen sliding scale   SubCutaneous at bedtime  pantoprazole  Injectable 40 milliGRAM(s) IV Push every 12 hours  tamsulosin 0.4 milliGRAM(s) Oral at bedtime      MEDICATIONS  (PRN):  acetaminophen     Tablet .. 650 milliGRAM(s) Oral every 6 hours PRN Temp greater or equal to 38C (100.4F), Mild Pain (1 - 3)  aluminum hydroxide/magnesium hydroxide/simethicone Suspension 30 milliLiter(s) Oral every 4 hours PRN Dyspepsia        REVIEW OF SYSTEMS:                           ALL ROS DONE [ X   ]      CONSTITUTIONAL:  LETHARGIC [   ], FEVER [   ], UNRESPONSIVE [   ]  CVS:  CP  [   ], SOB, [   ], PALPITATIONS [   ], DIZZYNESS [   ]  RS: COUGH [   ], SPUTUM [   ]  GI: ABDOMINAL PAIN [   ], NAUSEA [   ], VOMITINGS [   ], DIARRHEA [   ], CONSTIPATION [   ]  :  DYSURIA [   ], NOCTURIA [   ], INCREASED FREQUENCY [   ], DRIBLING [   ],  SKELETAL: PAINFUL JOINTS [   ], SWOLLEN JOINTS [   ], NECK ACHE [   ], LOW BACK ACHE [   ],  SKIN : ULCERS [   ], RASH [   ], ITCHING [   ]  CNS: HEAD ACHE [   ], DOUBLE VISION [   ], BLURRED VISION [   ], AMS / CONFUSION [   ], SEIZURES [   ], WEAKNESS [   ],TINGLING / NUMBNESS [   ]      PHYSICAL EXAMINATION:    GENERAL APPEARANCE: NO DISTRESS  HEENT:  NO PALLOR, NO  JVD,  NO   NODES, NECK SUPPLE  CVS: S1 +, S2 +,   RS: AEEB,  OCCASIONAL  RALES +,   NO RONCHI  ABD: SOFT, NT, NO, BS +  EXT: NO PE  SKIN: WARM,   SKELETAL:  ROM ACCEPTABLE  CNS:  AAO X 2   , NO  DEFICITS        RADIOLOGY :            ASSESSMENT :     Gastrointestinal hemorrhage    HTN (hypertension)    HLD (hyperlipidemia)    DM (diabetes mellitus)    Folate deficiency    BPH (benign prostatic hyperplasia)    CAD (coronary artery disease)    Glaucoma        PLAN:  HPI:  83 yo M, from [place] ambulates with a walker and cane at baseline, w PMH of HTN, HLD, DM, BPH, CAD, folate deficiency, glaucoma p/w rectal bleeding since 07/03/2023. He states that he has been having BRBPR since Monday with last episode the day of admission in the morning. Pt denies any prior episodes of GIB. He also denies any prior colonoscopies, or EGDs. He denies any family hx of colon cancer. He denies any CP, SOB, Palpitation, NV,D,Abd pain. He states that beside the lower GIB he has no other complains.  (08 Jul 2023 15:05)      # DC PLAN HOME IN AM AND FOLLOW UP GI AS OUT PATIENT FOR FURTHER WORK UP  - HEMODYNAMICALLY STABLE , AND STABLE Hb LEVELS     # GI BLEED , NORMOCYTIC ANEMIA - OBSERVE SERIAL CBC  , CONTINUE PROTONIX   - GI CONSULT FOR EGD & COLONOSCOPY   - ORDERED CT OF ABDOMEN & PELVIS WITH CONTRAST    # DCD TELEMETRY    # RESUME DIET     # AT BOOTS     DR. EVAN BIGGS

## 2023-07-09 NOTE — PROGRESS NOTE ADULT - PROBLEM SELECTOR PLAN 1
- p/w dark black tarry stool and BRBPR since 07/03  - no hx NSAIDs abuse  Consider GI consult on Monday or transfer out if pt is hemodynamically unstable or bleeding persists

## 2023-07-10 ENCOUNTER — TRANSCRIPTION ENCOUNTER (OUTPATIENT)
Age: 83
End: 2023-07-10

## 2023-07-10 DIAGNOSIS — D62 ACUTE POSTHEMORRHAGIC ANEMIA: ICD-10-CM

## 2023-07-10 DIAGNOSIS — K92.1 MELENA: ICD-10-CM

## 2023-07-10 LAB
ALBUMIN SERPL ELPH-MCNC: 2.6 G/DL — LOW (ref 3.5–5)
ALP SERPL-CCNC: 82 U/L — SIGNIFICANT CHANGE UP (ref 40–120)
ALT FLD-CCNC: 27 U/L DA — SIGNIFICANT CHANGE UP (ref 10–60)
ANION GAP SERPL CALC-SCNC: 4 MMOL/L — LOW (ref 5–17)
AST SERPL-CCNC: 32 U/L — SIGNIFICANT CHANGE UP (ref 10–40)
BASOPHILS # BLD AUTO: 0.03 K/UL — SIGNIFICANT CHANGE UP (ref 0–0.2)
BASOPHILS NFR BLD AUTO: 0.4 % — SIGNIFICANT CHANGE UP (ref 0–2)
BILIRUB SERPL-MCNC: 0.3 MG/DL — SIGNIFICANT CHANGE UP (ref 0.2–1.2)
BUN SERPL-MCNC: 13 MG/DL — SIGNIFICANT CHANGE UP (ref 7–18)
CALCIUM SERPL-MCNC: 8.7 MG/DL — SIGNIFICANT CHANGE UP (ref 8.4–10.5)
CHLORIDE SERPL-SCNC: 106 MMOL/L — SIGNIFICANT CHANGE UP (ref 96–108)
CO2 SERPL-SCNC: 29 MMOL/L — SIGNIFICANT CHANGE UP (ref 22–31)
CREAT SERPL-MCNC: 0.67 MG/DL — SIGNIFICANT CHANGE UP (ref 0.5–1.3)
EGFR: 93 ML/MIN/1.73M2 — SIGNIFICANT CHANGE UP
EOSINOPHIL # BLD AUTO: 0.29 K/UL — SIGNIFICANT CHANGE UP (ref 0–0.5)
EOSINOPHIL NFR BLD AUTO: 3.5 % — SIGNIFICANT CHANGE UP (ref 0–6)
FERRITIN SERPL-MCNC: 42 NG/ML — SIGNIFICANT CHANGE UP (ref 30–400)
GLUCOSE BLDC GLUCOMTR-MCNC: 132 MG/DL — HIGH (ref 70–99)
GLUCOSE BLDC GLUCOMTR-MCNC: 145 MG/DL — HIGH (ref 70–99)
GLUCOSE BLDC GLUCOMTR-MCNC: 146 MG/DL — HIGH (ref 70–99)
GLUCOSE BLDC GLUCOMTR-MCNC: 151 MG/DL — HIGH (ref 70–99)
GLUCOSE BLDC GLUCOMTR-MCNC: 152 MG/DL — HIGH (ref 70–99)
GLUCOSE BLDC GLUCOMTR-MCNC: 162 MG/DL — HIGH (ref 70–99)
GLUCOSE SERPL-MCNC: 144 MG/DL — HIGH (ref 70–99)
HCT VFR BLD CALC: 26.1 % — LOW (ref 39–50)
HCT VFR BLD CALC: 29.1 % — LOW (ref 39–50)
HGB BLD-MCNC: 8 G/DL — LOW (ref 13–17)
HGB BLD-MCNC: 9.1 G/DL — LOW (ref 13–17)
IMM GRANULOCYTES NFR BLD AUTO: 0.6 % — SIGNIFICANT CHANGE UP (ref 0–0.9)
INR BLD: 1.12 RATIO — SIGNIFICANT CHANGE UP (ref 0.88–1.16)
LYMPHOCYTES # BLD AUTO: 1.52 K/UL — SIGNIFICANT CHANGE UP (ref 1–3.3)
LYMPHOCYTES # BLD AUTO: 18.2 % — SIGNIFICANT CHANGE UP (ref 13–44)
MAGNESIUM SERPL-MCNC: 1.7 MG/DL — SIGNIFICANT CHANGE UP (ref 1.6–2.6)
MCHC RBC-ENTMCNC: 27.3 PG — SIGNIFICANT CHANGE UP (ref 27–34)
MCHC RBC-ENTMCNC: 28.1 PG — SIGNIFICANT CHANGE UP (ref 27–34)
MCHC RBC-ENTMCNC: 30.7 GM/DL — LOW (ref 32–36)
MCHC RBC-ENTMCNC: 31.3 GM/DL — LOW (ref 32–36)
MCV RBC AUTO: 89.1 FL — SIGNIFICANT CHANGE UP (ref 80–100)
MCV RBC AUTO: 89.8 FL — SIGNIFICANT CHANGE UP (ref 80–100)
MONOCYTES # BLD AUTO: 0.68 K/UL — SIGNIFICANT CHANGE UP (ref 0–0.9)
MONOCYTES NFR BLD AUTO: 8.2 % — SIGNIFICANT CHANGE UP (ref 2–14)
NEUTROPHILS # BLD AUTO: 5.76 K/UL — SIGNIFICANT CHANGE UP (ref 1.8–7.4)
NEUTROPHILS NFR BLD AUTO: 69.1 % — SIGNIFICANT CHANGE UP (ref 43–77)
NRBC # BLD: 0 /100 WBCS — SIGNIFICANT CHANGE UP (ref 0–0)
NRBC # BLD: 0 /100 WBCS — SIGNIFICANT CHANGE UP (ref 0–0)
PHOSPHATE SERPL-MCNC: 2.9 MG/DL — SIGNIFICANT CHANGE UP (ref 2.5–4.5)
PLATELET # BLD AUTO: 375 K/UL — SIGNIFICANT CHANGE UP (ref 150–400)
PLATELET # BLD AUTO: 472 K/UL — HIGH (ref 150–400)
POTASSIUM SERPL-MCNC: 4.1 MMOL/L — SIGNIFICANT CHANGE UP (ref 3.5–5.3)
POTASSIUM SERPL-SCNC: 4.1 MMOL/L — SIGNIFICANT CHANGE UP (ref 3.5–5.3)
PROT SERPL-MCNC: 5.9 G/DL — LOW (ref 6–8.3)
PROTHROM AB SERPL-ACNC: 13.3 SEC — SIGNIFICANT CHANGE UP (ref 10.5–13.4)
RBC # BLD: 2.93 M/UL — LOW (ref 4.2–5.8)
RBC # BLD: 3.24 M/UL — LOW (ref 4.2–5.8)
RBC # FLD: 14.9 % — HIGH (ref 10.3–14.5)
RBC # FLD: 14.9 % — HIGH (ref 10.3–14.5)
SODIUM SERPL-SCNC: 139 MMOL/L — SIGNIFICANT CHANGE UP (ref 135–145)
TRANSFERRIN SERPL-MCNC: 212 MG/DL — SIGNIFICANT CHANGE UP (ref 200–360)
WBC # BLD: 8.33 K/UL — SIGNIFICANT CHANGE UP (ref 3.8–10.5)
WBC # BLD: 9.44 K/UL — SIGNIFICANT CHANGE UP (ref 3.8–10.5)
WBC # FLD AUTO: 8.33 K/UL — SIGNIFICANT CHANGE UP (ref 3.8–10.5)
WBC # FLD AUTO: 9.44 K/UL — SIGNIFICANT CHANGE UP (ref 3.8–10.5)

## 2023-07-10 PROCEDURE — 43235 EGD DIAGNOSTIC BRUSH WASH: CPT

## 2023-07-10 PROCEDURE — 74177 CT ABD & PELVIS W/CONTRAST: CPT | Mod: 26

## 2023-07-10 PROCEDURE — 99222 1ST HOSP IP/OBS MODERATE 55: CPT | Mod: 25

## 2023-07-10 RX ORDER — SOD SULF/SODIUM/NAHCO3/KCL/PEG
4000 SOLUTION, RECONSTITUTED, ORAL ORAL ONCE
Refills: 0 | Status: COMPLETED | OUTPATIENT
Start: 2023-07-10 | End: 2023-07-10

## 2023-07-10 RX ORDER — ATORVASTATIN CALCIUM 80 MG/1
10 TABLET, FILM COATED ORAL AT BEDTIME
Refills: 0 | Status: DISCONTINUED | OUTPATIENT
Start: 2023-07-10 | End: 2023-07-11

## 2023-07-10 RX ORDER — SODIUM CHLORIDE 9 MG/ML
1000 INJECTION, SOLUTION INTRAVENOUS
Refills: 0 | Status: DISCONTINUED | OUTPATIENT
Start: 2023-07-10 | End: 2023-07-11

## 2023-07-10 RX ADMIN — ATORVASTATIN CALCIUM 10 MILLIGRAM(S): 80 TABLET, FILM COATED ORAL at 21:35

## 2023-07-10 RX ADMIN — BRIMONIDINE TARTRATE 1 DROP(S): 2 SOLUTION/ DROPS OPHTHALMIC at 13:28

## 2023-07-10 RX ADMIN — Medication 4000 MILLILITER(S): at 19:22

## 2023-07-10 RX ADMIN — PANTOPRAZOLE SODIUM 40 MILLIGRAM(S): 20 TABLET, DELAYED RELEASE ORAL at 18:39

## 2023-07-10 RX ADMIN — BRIMONIDINE TARTRATE 1 DROP(S): 2 SOLUTION/ DROPS OPHTHALMIC at 21:35

## 2023-07-10 RX ADMIN — SODIUM CHLORIDE 100 MILLILITER(S): 9 INJECTION, SOLUTION INTRAVENOUS at 13:27

## 2023-07-10 RX ADMIN — SODIUM CHLORIDE 65 MILLILITER(S): 9 INJECTION, SOLUTION INTRAVENOUS at 19:21

## 2023-07-10 RX ADMIN — TAMSULOSIN HYDROCHLORIDE 0.4 MILLIGRAM(S): 0.4 CAPSULE ORAL at 21:35

## 2023-07-10 RX ADMIN — BRIMONIDINE TARTRATE 1 DROP(S): 2 SOLUTION/ DROPS OPHTHALMIC at 05:57

## 2023-07-10 RX ADMIN — PANTOPRAZOLE SODIUM 40 MILLIGRAM(S): 20 TABLET, DELAYED RELEASE ORAL at 05:57

## 2023-07-10 NOTE — PROGRESS NOTE ADULT - ASSESSMENT
81 y/o M, from [facility], ambulates with a walker and cane at baseline, w PMH of HTN, HLD, DM, BPH, CAD, folate deficiency, glaucoma p/w rectal bleeding since 07/03/2023. He states that he has been having bright red blood per rectum since Monday 7/3/23. Pt is admitted for lower GI bleed and acute anemia.

## 2023-07-10 NOTE — PROGRESS NOTE ADULT - PROBLEM SELECTOR PLAN 1
- p/w dark black tarry stool and BRBPR since 07/03  - no hx NSAIDs abuse  - p/w Hb  8.2 unknown baseline,   - s/p 1 LNS, protonix IVP and drip, and 1 U pRBC in ED  - no Hypotension or any signs of hemorrhagic shock   - Type and Screen  - NPO  - c/w IV Protonix BID   - f/u CBC q12hrs   - f/u Anemia panel ,  LDH , TIBC , Total Iron , Transferrin , Ferritin , Reticulocytes   - Transfuse as needed if hg drops <7    Consider GI consult on Monday or transfer out if pt is hemodynamically unstable or bleeding persists p/w dark black tarry stool and BRBPR since 07/03  CT A/P showing diverticulosis   no hx NSAIDs abuse  NPO  c/w D5 L5 65 cc/hr   c/w IV Protonix BID   GI Dr. Morrissey consulted, apprec recs

## 2023-07-10 NOTE — PROGRESS NOTE ADULT - PROBLEM SELECTOR PLAN 2
hgb downtrended from 8.7-8.0  pt was last given a unit of blood on 7/8/23  monitor CBC q6h p/w Hb 8.2 unknown baseline  hgb downtrended from 8.7-8.0  pt was last given a unit of blood on 7/8/23  monitor CBC q6h  type and screen every 3 days - next 7/12  Transfuse as needed if hg drops <7

## 2023-07-10 NOTE — PROGRESS NOTE ADULT - SUBJECTIVE AND OBJECTIVE BOX
PATIENT SEEN AND EXAMINED ON :- 7/10/23  DATE OF SERVICE:    7/10/23         Interim events noted,Labs ,Radiological studies and Cardiology tests reviewed .    MR#[id number]  PATIENT NAME:-KIYA HUMPHRIES           [facility] COURSE: HPI:  83 yo M, from NH ambulates with a walker and cane at baseline, w PMH of HTN, HLD, DM, BPH, CAD, folate deficiency, glaucoma p/w rectal bleeding since 07/03/2023. He states that he has been having BRBPR since Monday with last episode the day of admission in the morning. Pt denies any prior episodes of GIB. He also denies any prior colonoscopies, or EGDs. He denies any family hx of colon cancer. He denies any CP, SOB, Palpitation, NV,D,Abd pain. He states that beside the lower GIB he has no other complains.  (08 Jul 2023 15:05)      INTERIM EVENTS:Patient seen at bedside ,interim events noted.      PMH -reviewed admission note, no change since admission  HEART FAILURE: Acute[ ]Chronic[ ] Systolic[ ] Diastolic[ ] Combined Systolic and Diastolic[ ]  CAD[ ] CABG[ ] PCI[ ]  DEVICES[ ] PPM[ ] ICD[ ] ILR[ ]  ATRIAL FIBRILLATION[ ] Paroxysmal[ ] Permanent[ ] CHADS2-[  ]  SAHRA[ ] CKD1[ ] CKD2[ ] CKD3[ ] CKD4[ ] ESRD[ ]  COPD[ ] HTN[ ]   DM[ ] Type1[ ] Type 2[ ]   CVA[ ] Paresis[ ]    AMBULATION: Assisted[ ] Cane/walker[ ] Independent[ ]    MEDICATIONS  (STANDING):  aspirin  chewable 81 milliGRAM(s) Oral daily  atorvastatin 10 milliGRAM(s) Oral at bedtime  brimonidine 0.2% Ophthalmic Solution 1 Drop(s) Right EYE three times a day  dextrose 5% + lactated ringers. 1000 milliLiter(s) (100 mL/Hr) IV Continuous <Continuous>  dextrose 5% + lactated ringers. 1000 milliLiter(s) (65 mL/Hr) IV Continuous <Continuous>  finasteride 5 milliGRAM(s) Oral daily  folic acid 1 milliGRAM(s) Oral daily  insulin lispro (ADMELOG) corrective regimen sliding scale   SubCutaneous at bedtime  insulin lispro (ADMELOG) corrective regimen sliding scale   SubCutaneous every 6 hours  pantoprazole  Injectable 40 milliGRAM(s) IV Push every 12 hours  tamsulosin 0.4 milliGRAM(s) Oral at bedtime    MEDICATIONS  (PRN):  acetaminophen     Tablet .. 650 milliGRAM(s) Oral every 6 hours PRN Temp greater or equal to 38C (100.4F), Mild Pain (1 - 3)  aluminum hydroxide/magnesium hydroxide/simethicone Suspension 30 milliLiter(s) Oral every 4 hours PRN Dyspepsia            REVIEW OF SYSTEMS:  Constitutional: [ ] fever, [ ]weight loss,  [ ]fatigue [ ]weight gain  Eyes: [ ] visual changes  Respiratory: [ ]shortness of breath;  [ ] cough, [ ]wheezing, [ ]chills, [ ]hemoptysis  Cardiovascular: [ ] chest pain, [ ]palpitations, [ ]dizziness,  [ ]leg swelling[ ]orthopnea[ ]PND  Gastrointestinal: [ ] abdominal pain, [ ]nausea, [ ]vomiting,  [ ]diarrhea [ ]Constipation [ ]Melena  Genitourinary: [ ] dysuria, [ ] hematuria [ ]Hong  Neurologic: [ ] headaches [ ] tremors[ ]weakness [ ]Paralysis Right[ ] Left[ ]  Skin: [ ] itching, [ ]burning, [ ] rashes  Endocrine: [ ] heat or cold intolerance  Musculoskeletal: [ ] joint pain or swelling; [ ] muscle, back, or extremity pain  Psychiatric: [ ] depression, [ ]anxiety, [ ]mood swings, or [ ]difficulty sleeping  Hematologic: [ ] easy bruising, [ ] bleeding gums    [ ] All remaining systems negative except as per above.   [ ]Unable to obtain.  [x] No change in ROS since admission      Vital Signs Last 24 Hrs  T(C): 36.5 (10 Jul 2023 20:55), Max: 37 (10 Jul 2023 18:44)  T(F): 97.7 (10 Jul 2023 20:55), Max: 98.6 (10 Jul 2023 18:44)  HR: 58 (10 Jul 2023 20:55) (58 - 106)  BP: 115/77 (10 Jul 2023 20:55) (100/60 - 151/84)  BP(mean): --  RR: 18 (10 Jul 2023 20:55) (15 - 19)  SpO2: 100% (10 Jul 2023 20:55) (95% - 100%)    Parameters below as of 10 Jul 2023 20:55  Patient On (Oxygen Delivery Method): room air      I&O's Summary    09 Jul 2023 07:01  -  10 Jul 2023 07:00  --------------------------------------------------------  IN: 1330 mL / OUT: 0 mL / NET: 1330 mL    10 Jul 2023 07:01  -  10 Jul 2023 22:55  --------------------------------------------------------  IN: 1295 mL / OUT: 0 mL / NET: 1295 mL        PHYSICAL EXAM:  General: No acute distress BMI-  HEENT: EOMI, PERRL  Neck: Supple, [ ] JVD  Lungs: Equal air entry bilaterally; [ ] rales [ ] wheezing [ ] rhonchi  Heart: Regular rate and rhythm; [x ] murmur   2/6 [ x] systolic [ ] diastolic [ ] radiation[ ] rubs [ ]  gallops  Abdomen: Nontender, bowel sounds present  Extremities: No clubbing, cyanosis, [ ] edema [ ]Pulses  equal and intact  Nervous system:  Alert & Oriented X3, no focal deficits  Psychiatric: Normal affect  Skin: No rashes or lesions    LABS:  07-10    139  |  106  |  13  ----------------------------<  144<H>  4.1   |  29  |  0.67    Ca    8.7      10 Jul 2023 06:27  Phos  2.9     07-10  Mg     1.7     07-10    TPro  5.9<L>  /  Alb  2.6<L>  /  TBili  0.3  /  DBili  x   /  AST  32  /  ALT  27  /  AlkPhos  82  07-10    Creatinine Trend: 0.67<--, 0.72<--, 0.81<--                        9.1    9.44  )-----------( 472      ( 10 Jul 2023 14:45 )             29.1     PT/INR - ( 10 Jul 2023 16:48 )   PT: 13.3 sec;   INR: 1.12 ratio

## 2023-07-10 NOTE — PROGRESS NOTE ADULT - SUBJECTIVE AND OBJECTIVE BOX
Patient is a 82y old  Male who presents with a chief complaint of Gastrointestinal hemorrhage     (10 Jul 2023 13:12)      OVERNIGHT EVENTS:     REVIEW OF SYSTEMS:  CONSTITUTIONAL: No fever, chills  ENMT:  No difficulty hearing, no change in vision  NECK: No pain or stiffness  RESPIRATORY: No cough, SOB  CARDIOVASCULAR: No chest pain, palpitations  GASTROINTESTINAL: No abdominal pain. No nausea, vomiting, or diarrhea  GENITOURINARY: No dysuria  NEUROLOGICAL: No HA  SKIN: No itching, burning, rashes, or lesions   LYMPH NODES: No enlarged glands  ENDOCRINE: No heat or cold intolerance; No hair loss  MUSCULOSKELETAL: No joint pain or swelling; No muscle, back, or extremity pain  PSYCHIATRIC: No depression, anxiety  HEME/LYMPH: No easy bruising, or bleeding gums    T(C): 36.8 (07-10-23 @ 11:19), Max: 36.9 (07-09-23 @ 20:01)  HR: 92 (07-10-23 @ 11:19) (70 - 98)  BP: 110/73 (07-10-23 @ 11:19) (100/60 - 144/86)  RR: 18 (07-10-23 @ 11:19) (17 - 18)  SpO2: 98% (07-10-23 @ 11:19) (96% - 99%)  Wt(kg): --Vital Signs Last 24 Hrs  T(C): 36.8 (10 Jul 2023 11:19), Max: 36.9 (09 Jul 2023 20:01)  T(F): 98.2 (10 Jul 2023 11:19), Max: 98.4 (09 Jul 2023 20:01)  HR: 92 (10 Jul 2023 11:19) (70 - 98)  BP: 110/73 (10 Jul 2023 11:19) (100/60 - 144/86)  BP(mean): --  RR: 18 (10 Jul 2023 11:19) (17 - 18)  SpO2: 98% (10 Jul 2023 11:19) (96% - 99%)    Parameters below as of 10 Jul 2023 11:19  Patient On (Oxygen Delivery Method): room air        MEDICATIONS  (STANDING):  aspirin  chewable 81 milliGRAM(s) Oral daily  atorvastatin 80 milliGRAM(s) Oral at bedtime  brimonidine 0.2% Ophthalmic Solution 1 Drop(s) Right EYE three times a day  dextrose 5% + lactated ringers. 1000 milliLiter(s) (100 mL/Hr) IV Continuous <Continuous>  finasteride 5 milliGRAM(s) Oral daily  folic acid 1 milliGRAM(s) Oral daily  insulin lispro (ADMELOG) corrective regimen sliding scale   SubCutaneous every 6 hours  insulin lispro (ADMELOG) corrective regimen sliding scale   SubCutaneous at bedtime  pantoprazole  Injectable 40 milliGRAM(s) IV Push every 12 hours  tamsulosin 0.4 milliGRAM(s) Oral at bedtime    MEDICATIONS  (PRN):  acetaminophen     Tablet .. 650 milliGRAM(s) Oral every 6 hours PRN Temp greater or equal to 38C (100.4F), Mild Pain (1 - 3)  aluminum hydroxide/magnesium hydroxide/simethicone Suspension 30 milliLiter(s) Oral every 4 hours PRN Dyspepsia      PHYSICAL EXAM:  GENERAL: NAD  EYES: clear conjunctiva; EOMI  ENMT: Moist mucous membranes  NECK: Supple, No JVD, Normal thyroid  CHEST/LUNG: Clear to auscultation bilaterally; No rales, rhonchi, wheezing, or rubs  HEART: S1, S2, Regular rate and rhythm  ABDOMEN: Soft, Nontender, Nondistended; Bowel sounds present  NEURO: Alert & Oriented X3  EXTREMITIES: No LE edema, no calf tenderness  LYMPH: No lymphadenopathy noted  SKIN: No rashes or lesions    Consultant(s) Notes Reviewed:  [x ] YES  [ ] NO  Care Discussed with Consultants/Other Providers [ x] YES  [ ] NO    LABS:                        8.0    8.33  )-----------( 375      ( 10 Jul 2023 06:27 )             26.1     07-10    139  |  106  |  13  ----------------------------<  144<H>  4.1   |  29  |  0.67    Ca    8.7      10 Jul 2023 06:27  Phos  2.9     07-10  Mg     1.7     07-10    TPro  5.9<L>  /  Alb  2.6<L>  /  TBili  0.3  /  DBili  x   /  AST  32  /  ALT  27  /  AlkPhos  82  07-10      CAPILLARY BLOOD GLUCOSE      POCT Blood Glucose.: 146 mg/dL (10 Jul 2023 11:23)  POCT Blood Glucose.: 145 mg/dL (10 Jul 2023 07:23)  POCT Blood Glucose.: 152 mg/dL (10 Jul 2023 05:26)  POCT Blood Glucose.: 151 mg/dL (10 Jul 2023 00:38)  POCT Blood Glucose.: 146 mg/dL (09 Jul 2023 19:54)  POCT Blood Glucose.: 135 mg/dL (09 Jul 2023 16:46)        Urinalysis Basic - ( 10 Jul 2023 06:27 )    Color: x / Appearance: x / SG: x / pH: x  Gluc: 144 mg/dL / Ketone: x  / Bili: x / Urobili: x   Blood: x / Protein: x / Nitrite: x   Leuk Esterase: x / RBC: x / WBC x   Sq Epi: x / Non Sq Epi: x / Bacteria: x        RADIOLOGY & ADDITIONAL TESTS:    Imaging Personally Reviewed:  [ ] YES  [ ] NO   Patient is a 82y old  Male who presents with a chief complaint of Gastrointestinal hemorrhage     (10 Jul 2023 13:12)      OVERNIGHT EVENTS: pt reported 5 episodes of bright red blood per rectum. Hgb downtrended from 8.7- 8.0. Pt is awake, well appearing.     REVIEW OF SYSTEMS:  CONSTITUTIONAL: No fever, chills  ENMT:  No difficulty hearing, no change in vision  NECK: No pain or stiffness  RESPIRATORY: No cough, SOB  CARDIOVASCULAR: No chest pain, palpitations  GASTROINTESTINAL: +bleeding  GENITOURINARY: No dysuria  NEUROLOGICAL: No HA  SKIN: No itching, burning, rashes, or lesions   LYMPH NODES: No enlarged glands  ENDOCRINE: No heat or cold intolerance; No hair loss  MUSCULOSKELETAL: +b/l shoulder pain  PSYCHIATRIC: No depression, anxiety  HEME/LYMPH: No easy bruising, or bleeding gums    T(C): 36.8 (07-10-23 @ 11:19), Max: 36.9 (07-09-23 @ 20:01)  HR: 92 (07-10-23 @ 11:19) (70 - 98)  BP: 110/73 (07-10-23 @ 11:19) (100/60 - 144/86)  RR: 18 (07-10-23 @ 11:19) (17 - 18)  SpO2: 98% (07-10-23 @ 11:19) (96% - 99%)  Wt(kg): --Vital Signs Last 24 Hrs  T(C): 36.8 (10 Jul 2023 11:19), Max: 36.9 (09 Jul 2023 20:01)  T(F): 98.2 (10 Jul 2023 11:19), Max: 98.4 (09 Jul 2023 20:01)  HR: 92 (10 Jul 2023 11:19) (70 - 98)  BP: 110/73 (10 Jul 2023 11:19) (100/60 - 144/86)  BP(mean): --  RR: 18 (10 Jul 2023 11:19) (17 - 18)  SpO2: 98% (10 Jul 2023 11:19) (96% - 99%)    Parameters below as of 10 Jul 2023 11:19  Patient On (Oxygen Delivery Method): room air        MEDICATIONS  (STANDING):  aspirin  chewable 81 milliGRAM(s) Oral daily  atorvastatin 80 milliGRAM(s) Oral at bedtime  brimonidine 0.2% Ophthalmic Solution 1 Drop(s) Right EYE three times a day  dextrose 5% + lactated ringers. 1000 milliLiter(s) (100 mL/Hr) IV Continuous <Continuous>  finasteride 5 milliGRAM(s) Oral daily  folic acid 1 milliGRAM(s) Oral daily  insulin lispro (ADMELOG) corrective regimen sliding scale   SubCutaneous every 6 hours  insulin lispro (ADMELOG) corrective regimen sliding scale   SubCutaneous at bedtime  pantoprazole  Injectable 40 milliGRAM(s) IV Push every 12 hours  tamsulosin 0.4 milliGRAM(s) Oral at bedtime    MEDICATIONS  (PRN):  acetaminophen     Tablet .. 650 milliGRAM(s) Oral every 6 hours PRN Temp greater or equal to 38C (100.4F), Mild Pain (1 - 3)  aluminum hydroxide/magnesium hydroxide/simethicone Suspension 30 milliLiter(s) Oral every 4 hours PRN Dyspepsia      PHYSICAL EXAM:  GENERAL: NAD  EYES: clear conjunctiva  ENMT: Moist mucous membranes  NECK: Supple, No JVD, Normal thyroid  CHEST/LUNG: Clear to auscultation bilaterally; No rales, rhonchi, wheezing, or rubs  HEART: S1, S2, Regular rate and rhythm  ABDOMEN: Soft, Nontender, Nondistended; Bowel sounds present  NEURO: Alert & Oriented X3  EXTREMITIES: No LE edema, no calf tenderness  LYMPH: No lymphadenopathy noted  SKIN: No rashes or lesions    Consultant(s) Notes Reviewed:  [x ] YES  [ ] NO  Care Discussed with Consultants/Other Providers [ x] YES  [ ] NO    LABS:                        8.0    8.33  )-----------( 375      ( 10 Jul 2023 06:27 )             26.1     07-10    139  |  106  |  13  ----------------------------<  144<H>  4.1   |  29  |  0.67    Ca    8.7      10 Jul 2023 06:27  Phos  2.9     07-10  Mg     1.7     07-10    TPro  5.9<L>  /  Alb  2.6<L>  /  TBili  0.3  /  DBili  x   /  AST  32  /  ALT  27  /  AlkPhos  82  07-10      CAPILLARY BLOOD GLUCOSE      POCT Blood Glucose.: 146 mg/dL (10 Jul 2023 11:23)  POCT Blood Glucose.: 145 mg/dL (10 Jul 2023 07:23)  POCT Blood Glucose.: 152 mg/dL (10 Jul 2023 05:26)  POCT Blood Glucose.: 151 mg/dL (10 Jul 2023 00:38)  POCT Blood Glucose.: 146 mg/dL (09 Jul 2023 19:54)  POCT Blood Glucose.: 135 mg/dL (09 Jul 2023 16:46)        Urinalysis Basic - ( 10 Jul 2023 06:27 )    Color: x / Appearance: x / SG: x / pH: x  Gluc: 144 mg/dL / Ketone: x  / Bili: x / Urobili: x   Blood: x / Protein: x / Nitrite: x   Leuk Esterase: x / RBC: x / WBC x   Sq Epi: x / Non Sq Epi: x / Bacteria: x        RADIOLOGY & ADDITIONAL TESTS:  < from: CT Abdomen and Pelvis w/ IV Cont (07.10.23 @ 08:45) >    ACC: [id number] EXAM:  CT ABDOMEN AND PELVIS IC   ORDERED BY: JOYCE BIGGS     PROCEDURE DATE:  07/10/2023          INTERPRETATION:  CLINICAL INFORMATION: 82 years  Male with gi bleed,      to evaluate for gi malignancy. Rectal bleeding    COMPARISON: None.    CONTRAST/COMPLICATIONS:  IV Contrast: Omnipaque 350  90 cc administered   10 cc discarded  Oral Contrast: NONE  Complications: None reported at time of study completion    PROCEDURE:  CT of the Abdomen and Pelvis was performed.  Sagittal and coronal reformats were performed.    FINDINGS:  LOWER CHEST: Mild bibasilar dependent atelectasis. Bilateral gynecomastia.    LIVER: Nodular contour. Atrophic right lobe versus postoperative change.  BILE DUCTS: Normal caliber.  GALLBLADDER: Within normal limits.  SPLEEN: Within normal limits.  PANCREAS: Within normal limits.  ADRENALS: Within normal limits.  KIDNEYS/URETERS: Bilateral cysts and hypodensities too small to   characterize. No hydroureteronephrosis. Symmetrical nephrograms.    BLADDER: Decompressed.  REPRODUCTIVE ORGANS: Enlarged prostate measuring 6.7 cm transverse.    BOWEL: No bowel obstruction. Appendix is normal. Pancolonic   diverticulosis without diverticulitis. PERITONEUM: No ascites.  VESSELS: Atherosclerotic changes. J-shaped celiac artery with proximal   narrowing. 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  RETROPERITONEUM/LYMPH NODES: No lymphadenopathy.  ABDOMINAL WALL: Atrophic psoas muscles.  BONES: Degenerative changes.    IMPRESSION:  Pain colonic diverticulosis without diverticulitis. In light of rectal   bleeding, recommend colonoscopy.    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    Question cirrhosis. Atrophic right lobe versus postoperative change.    Enlarged prostate.      --- End of Report ---            EVERT ROBIN MD; Attending Radiologist  This document has been electronically signed. Jul 10 2023  9:16AM    < end of copied text >      Imaging Personally Reviewed:  [ ] YES  [ ] NO

## 2023-07-10 NOTE — DIETITIAN INITIAL EVALUATION ADULT - OTHER INFO
has No information On weight loss available. admitted for GI bleed. has history of DM . ordered for NPO. resume diet pending GI decision. has difficulty chewing, suggest Pureed upon diet resumption. Also does not eat Pork. Has No food allergies.

## 2023-07-10 NOTE — PROGRESS NOTE ADULT - PROBLEM SELECTOR PLAN 3
h/o HTN on losartan/ HCTZ 100/12.5 qd  hold home meds in the setting of GIB  BP controlled off meds for now  Monitor BP

## 2023-07-10 NOTE — CONSULT NOTE ADULT - ASSESSMENT
Due to presence of anemia (Hb< 10), multiple episodes of hematochezia, melena, + rectal exam for purple, wine color stool, GI recommendation is for colonoscopy procedure 7/10. Patient is an 82M with a PMHx of HTN, HLD, DM, BPH, CAD, folate deficiency, glaucoma, who presented with rectal bleeding. GI was consulted for GIB.     Patient reports BRBPR since 7/3, unable to identify trigger, this has never happened before. No prior EGD. Colonoscopy 20-30 yrs ago wnl. Denies use of NSAIDs. No family hx of liver disease or colorectal cancers. No smoking/etoh/drug use.     In the ED, Hgb 8.2, MCV 90.3 wnl, s/p 1u PRBC. CTAP - nodular liver contour ? cirrhosis, atrophic R lobe vs postoperative change, pancolonic diverticulosis, no ascites. Iron panel notable for low serum iron 54 otherwise normal, ferritin 42 wnl. Normal haptoglobin 127, , and transferrin 212. He had another episode of dark tarry stools with a metallic odor, as well as mild bright red blood in stool, however VSS. This morning, Hgb noted to downtrend to 8.0 from 8.7.     #GIB  #Anemia  #Cirrhosis  Patient denies prior hx of anemia, however Hgb on admission 8.2 with reports of BRBPR x 5 days prior to admission. Repeat Hgb 8.0, s/p 1u PRBC, with some improvement, Hgb 8.4. He had another episode of bloody BM on 7/9, Hgb the morning after 8.0 (from 8.7 the day before). BUN 13, Cr 0.67, BUN:Cr ~19, less indicative of upper GI source, however unable to rule out given no prior EGD and last colonoscopy 20-30 yrs ago (wnl). Reassuringly, VSS and patient was asymptomatic FAIZA notable for maroon colored stool. Differentials include diverticular (given hx of diverticulosis) vs polyp vs hemorrhoidal vs gastritis vs peptic/duodenal ulcer vs AVM vs GAVE (given advanced age) vs malignancy vs other. A slow transit lower GIB from the R colon is also possible. Given multiple episodes of recurrent GI bleed and FAIZA + wine/colored stool, patient may benefit from endoscopic evaluation. ? new diagnosis of cirrhosis, patient denies ever being told he has cirrhosis, LFTs wnl.     	- s/p 1u PRBC on admission for Hgb 8.0  	- PT/INR pending  	- Tentative EGD today, 7/10, with Dr. Morrissey  	- Tentative colonoscopy tomorrow, 7/11  	- Keep NPO, at 6PM please give 2L golytely tonight  	- Administer remaining 2L of golytely tomorrow morning at 6AM  	- AM labs: CBC, CMP, PT/INR  	- Monitor for recurrent bleeding  	- IV Protonix 40mg BID  	- Maintain active T&S, 2 large bore peripheral IVs, transfuse for goal Hgb >7 or if symptomatic  	- Trend H/H  	- Daily PT/INR  	- Consider hepatology consult for ? new cirrhosis     This note and its recommendations herein are preliminary until such time as cosigned by an attending.     Thank you for this consult! Patient is an 82M with a PMHx of HTN, HLD, DM, BPH, CAD, folate deficiency, glaucoma, who presented with rectal bleeding. GI was consulted for GIB.     Patient reports BRBPR since 7/3, unable to identify trigger, this has never happened before. No prior EGD. Colonoscopy 20-30 yrs ago wnl. Denies use of NSAIDs. No family hx of liver disease or colorectal cancers. No smoking/etoh/drug use.     In the ED, Hgb 8.2, MCV 90.3 wnl, s/p 1u PRBC. CTAP - nodular liver contour ? cirrhosis, atrophic R lobe vs postoperative change, pancolonic diverticulosis, no ascites. Iron panel notable for low serum iron 54 otherwise normal, ferritin 42 wnl. Normal haptoglobin 127, , and transferrin 212. He had another episode of dark tarry stools with a metallic odor, as well as mild bright red blood in stool, however VSS. This morning, Hgb noted to downtrend to 8.0 from 8.7.     #GIB  #Anemia  #Cirrhosis  Patient denies prior hx of anemia, however Hgb on admission 8.2 with reports of BRBPR x 5 days prior to admission. Repeat Hgb 8.0, s/p 1u PRBC, with some improvement, Hgb 8.4. He had another episode of bloody BM on 7/9, Hgb the morning after 8.0 (from 8.7 the day before). BUN 13, Cr 0.67, BUN:Cr ~19, less indicative of upper GI source, however unable to rule out given no prior EGD and last colonoscopy 20-30 yrs ago (wnl). Reassuringly, VSS and patient was asymptomatic. FAIZA notable for maroon colored stool. Differentials include diverticular (given diverticulosis on imaging) vs polyp vs hemorrhoidal vs gastritis vs peptic/duodenal ulcer vs AVM vs GAVE (given advanced age) vs malignancy vs other. A slow transit lower GIB from the R colon is also possible. Given multiple episodes of recurrent GI bleed and FAIZA + wine/colored stool, patient may benefit from endoscopic evaluation. Extensive discussion with patient including procedure technique, risks not limited to bleeding, perforation, infection, as well as anesthesia complications, patient agreeable to inpatient EGD. ? new diagnosis of cirrhosis, patient denies ever being told he has cirrhosis, LFTs wnl.     	- s/p 1u PRBC on admission for Hgb 8.0  	- PT/INR pending  	- Tentative EGD today, 7/10, with Dr. Morrissey  	- Tentative colonoscopy tomorrow, 7/11  	- CLD post-EGD today, at 6PM please give 2L golytely tonight  	- Administer remaining 2L of golytely tomorrow morning at 6AM  	- AM labs: CBC, CMP, PT/INR  	- Monitor for recurrent bleeding  	- IV Protonix 40mg BID  	- Maintain active T&S, 2 large bore peripheral IVs, transfuse for goal Hgb >7 or if symptomatic  	- Trend H/H  	- Daily PT/INR  	- Hold AC if safe per primary team  	- Covid swab within 72 hrs of procedure  	- Consider hepatology consult for ? new cirrhosis     This note and its recommendations herein are preliminary until such time as cosigned by an attending.     Thank you for this consult!

## 2023-07-10 NOTE — DIETITIAN INITIAL EVALUATION ADULT - PERTINENT MEDS FT
MEDICATIONS  (STANDING):  aspirin  chewable 81 milliGRAM(s) Oral daily  atorvastatin 80 milliGRAM(s) Oral at bedtime  brimonidine 0.2% Ophthalmic Solution 1 Drop(s) Right EYE three times a day  dextrose 5% + lactated ringers. 1000 milliLiter(s) (100 mL/Hr) IV Continuous <Continuous>  finasteride 5 milliGRAM(s) Oral daily  folic acid 1 milliGRAM(s) Oral daily  insulin lispro (ADMELOG) corrective regimen sliding scale   SubCutaneous at bedtime  insulin lispro (ADMELOG) corrective regimen sliding scale   SubCutaneous every 6 hours  pantoprazole  Injectable 40 milliGRAM(s) IV Push every 12 hours  tamsulosin 0.4 milliGRAM(s) Oral at bedtime    MEDICATIONS  (PRN):  acetaminophen     Tablet .. 650 milliGRAM(s) Oral every 6 hours PRN Temp greater or equal to 38C (100.4F), Mild Pain (1 - 3)  aluminum hydroxide/magnesium hydroxide/simethicone Suspension 30 milliLiter(s) Oral every 4 hours PRN Dyspepsia

## 2023-07-10 NOTE — PROGRESS NOTE ADULT - PROBLEM SELECTOR PLAN 6
- a1c 6.8  - Patient takes Metformin at home  - will hold home medications  - will start sliding scale q6h   - glucose controlled   - diabetic diet when not NPO

## 2023-07-10 NOTE — PROGRESS NOTE ADULT - PROBLEM SELECTOR PLAN 6
- Patient takes Metformin at home  - will hold home medications  - will start sliding scale  - f/u HgA1c  - diabetic diet when not NPO - a1c 6.8  - Patient takes Metformin at home  - will hold home medications  - will start sliding scale q6h   - glucose controlled   - diabetic diet when not NPO

## 2023-07-10 NOTE — CONSULT NOTE ADULT - SUBJECTIVE AND OBJECTIVE BOX
· Subjective and Objective:   INIITIAL GI CONSULTATION    Patient is a 82[place], ambulates with a walker and cane, w PMH of HTN, HLD, DM, BPH,  CAD, folate deficiency, glaucoma Pt. presents with rectal bleeding for last one week with bright red blood per rectum. Last episode of bloody stools occurred 7/9. GI consulted for bloody stools and evaluation of GI bleeding.    HPI:   Patient is a 82M from [place], ambulates with a walker and cane, w PMH of HTN, HLD, DM, BPH,  CAD, folate deficiency, glaucoma Pt. presents with rectal bleeding for last one week with bright red blood per rectum. Pt. described multiple BM starting 7/3/23 that were bloody and loose. Blood was usually bright red or dark red. Last episode of bloody stools occurred 7/9. Rectal exam (7/10/23) showed stool with purple/dark wine color. Pt. has never had these symptoms before. Patient's last colonoscopy was 30 years ago, no known EGD. Currently denies any abdominal pain, dizziness, nausea, vomiting, fatigue, chest pain, headache, urinary symptoms or fever. Pt has chronic cough with sputum production. Pt. denies any recent changes in weight. Pt. does not smoke/drink/use any drugs. He denies any family hx of colon cancer. He states that beside the lower GIB he has no other complains. Hb stable, 8.2 > 8.5 > 9.1 (7/10). Results from CT Abdomen and Pelvis w/ IV Cont (07.10.23 @ 08:45) showed colonic diverticulosis without diverticulitis. Question cirrhosis. Atrophic right lobe versus postoperative change. Enlarged prostate. GI consulted with lower GI bleed. Planning for colonoscopy today 7/10.         PMH/PSH:   BPH (benign prostatic hyperplasia)     CAD (coronary artery disease)     DM (diabetes mellitus)     Folate deficiency     Glaucoma     HLD (hyperlipidemia)     HTN (hypertension).     PAST SURGICAL HISTORY:  No significant past surgical history.     FAMILY HISTORY:  No pertinent family history in first degree relatives. No pertinent family history of: cancer.    REVIEW OF SYSTEMS:  CONSTITUTIONAL: No fever, weight loss, or fatigue  RESPIRATORY: No cough, wheezing, chills or hemoptysis; No shortness of breath  CARDIOVASCULAR: No chest pain, palpitations, dizziness, or leg swelling  GASTROINTESTINAL: No abdominal or epigastric pain. No nausea, vomiting, or hematemesis; No diarrhea or constipation.+ melena and hematochezia, BRBPRGENITOURINARY: No dysuria or hematuria, urinary frequency  NEUROLOGICAL: No headaches, memory loss, loss of strength, numbness, or tremors  SKIN: No itching, burning, rashes, or lesions     MEDICATIONS  (STANDING):  aspirin  chewable 81 milliGRAM(s) Oral daily  atorvastatin 10 milliGRAM(s) Oral at bedtime  brimonidine 0.2% Ophthalmic Solution 1 Drop(s) Right EYE three times a day  dextrose 5% + lactated ringers. 1000 milliLiter(s) (100 mL/Hr) IV Continuous <Continuous>  dextrose 5% + lactated ringers. 1000 milliLiter(s) (65 mL/Hr) IV Continuous <Continuous>  finasteride 5 milliGRAM(s) Oral daily  folic acid 1 milliGRAM(s) Oral daily  insulin lispro (ADMELOG) corrective regimen sliding scale   SubCutaneous every 6 hours  insulin lispro (ADMELOG) corrective regimen sliding scale   SubCutaneous at bedtime  pantoprazole  Injectable 40 milliGRAM(s) IV Push every 12 hours  tamsulosin 0.4 milliGRAM(s) Oral at bedtime    MEDICATIONS  (PRN):  acetaminophen     Tablet .. 650 milliGRAM(s) Oral every 6 hours PRN Temp greater or equal to 38C (100.4F), Mild Pain (1 - 3)  aluminum hydroxide/magnesium hydroxide/simethicone Suspension 30 milliLiter(s) Oral every 4 hours PRN Dyspepsia      Vital Signs Last 24 Hrs  T(C): 36.4 (10 Jul 2023 15:58), Max: 36.9 (09 Jul 2023 20:01)  T(F): 97.5 (10 Jul 2023 15:58), Max: 98.4 (09 Jul 2023 20:01)  HR: 92 (10 Jul 2023 15:58) (70 - 98)  BP: 130/73 (10 Jul 2023 15:58) (100/60 - 144/86)  BP(mean): --  RR: 19 (10 Jul 2023 15:58) (17 - 19)  SpO2: 97% (10 Jul 2023 15:58) (96% - 99%)    Parameters below as of 10 Jul 2023 15:58  Patient On (Oxygen Delivery Method): room air        PHYSICAL EXAMINATION:  GENERAL: NAD, well built  HEAD:  Atraumatic, Normocephalic  EYES:  conjunctiva and sclera clear  NECK: Supple, No JVD, Normal thyroid  CHEST/LUNG: Clear to auscultation. Clear to percussion bilaterally; No rales, rhonchi, wheezing, or rubs  HEART: Regular rate and rhythm; No murmurs, rubs, or gallops  ABDOMEN: Soft, Nontender, Nondistended; Bowel sounds present, no pain or masses on palpation  NERVOUS SYSTEM:  Alert & Oriented X3  : voiding well  EXTREMITIES:  2+ Peripheral Pulses, No clubbing, cyanosis, or edema  SKIN: warm dry                          9.1    9.44  )-----------( 472      ( 10 Jul 2023 14:45 )             29.1     07-10    139  |  106  |  13  ----------------------------<  144<H>  4.1   |  29  |  0.67    Ca    8.7      10 Jul 2023 06:27  Phos  2.9     07-10  Mg     1.7     07-10    TPro  5.9<L>  /  Alb  2.6<L>  /  TBili  0.3  /  DBili  x   /  AST  32  /  ALT  27  /  AlkPhos  82  07-10    LIVER FUNCTIONS - ( 10 Jul 2023 06:27 )  Alb: 2.6 g/dL / Pro: 5.9 g/dL / ALK PHOS: 82 U/L / ALT: 27 U/L DA / AST: 32 U/L / GGT: x                   I&O's Summary    09 Jul 2023 07:01  -  10 Jul 2023 07:00  --------------------------------------------------------  IN: 1330 mL / OUT: 0 mL / NET: 1330 mL    10 Jul 2023 07:01  -  10 Jul 2023 16:01  --------------------------------------------------------  IN: 200 mL / OUT: 0 mL / NET: 200 mL            CAPILLARY BLOOD GLUCOSE      RADIOLOGY & ADDITIONAL TESTS:     from: CT Abdomen and Pelvis w/ IV Cont (07.10.23 @ 08:45)   IMPRESSION:  Pain colonic diverticulosis without diverticulitis. In light of rectal   bleeding, recommend colonoscopy.    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    Question cirrhosis. Atrophic right lobe versus postoperative change.    Enlarged prostate.                         · Subjective and Objective:   INIITIAL GI CONSULTATION    Patient is a 82M from [place], ambulates with a walker and cane, w PMH of HTN, HLD, DM, BPH,  CAD, folate deficiency, glaucoma Pt. presents with rectal bleeding for last one week with bright red blood per rectum. Last episode of bloody stools occurred 7/9. GI consulted for bloody stools and evaluation of GI bleeding.    HPI:   Patient is a 82M from [place], ambulates with a walker and cane, w PMH of HTN, HLD, DM, BPH,  CAD, folate deficiency, glaucoma Pt. presents with rectal bleeding for last one week with bright red blood per rectum. Pt. described multiple BM starting 7/3/23 that were bloody and loose. Blood was usually bright red or dark red. Last episode of bloody stools occurred 7/9. Rectal exam (7/10/23) showed stool with purple/dark wine color. Pt. has never had these symptoms before. Patient's last colonoscopy was 30 years ago, no known EGD. Currently denies any abdominal pain, dizziness, nausea, vomiting, fatigue, chest pain, headache, urinary symptoms or fever. Pt has chronic cough with sputum production. Pt. denies any recent changes in weight. Pt. does not smoke/drink/use any drugs. He denies any family hx of colon cancer. He states that beside the lower GIB he has no other complains. Hb stable, 8.2 > 8.5 > 9.1 (7/10). Results from CT Abdomen and Pelvis w/ IV Cont (07.10.23 @ 08:45) showed colonic diverticulosis without diverticulitis. Question cirrhosis. Atrophic right lobe versus postoperative change. Enlarged prostate. GI consulted with lower GI bleed. Planning for colonoscopy today 7/10.         PMH/PSH:   BPH (benign prostatic hyperplasia)     CAD (coronary artery disease)     DM (diabetes mellitus)     Folate deficiency     Glaucoma     HLD (hyperlipidemia)     HTN (hypertension).     PAST SURGICAL HISTORY:  No significant past surgical history.     FAMILY HISTORY:  No pertinent family history in first degree relatives. No pertinent family history of: cancer.    REVIEW OF SYSTEMS:  CONSTITUTIONAL: No fever, weight loss, or fatigue  RESPIRATORY: No cough, wheezing, chills or hemoptysis; No shortness of breath  CARDIOVASCULAR: No chest pain, palpitations, dizziness, or leg swelling  GASTROINTESTINAL: No abdominal or epigastric pain. No nausea, vomiting, or hematemesis; No diarrhea or constipation.+ melena and hematochezia, BRBPR  GENITOURINARY: No dysuria or hematuria, urinary frequency  NEUROLOGICAL: No headaches, memory loss, loss of strength, numbness, or tremors  SKIN: No itching, burning, rashes, or lesions     MEDICATIONS  (STANDING):  aspirin  chewable 81 milliGRAM(s) Oral daily  atorvastatin 10 milliGRAM(s) Oral at bedtime  brimonidine 0.2% Ophthalmic Solution 1 Drop(s) Right EYE three times a day  dextrose 5% + lactated ringers. 1000 milliLiter(s) (100 mL/Hr) IV Continuous <Continuous>  dextrose 5% + lactated ringers. 1000 milliLiter(s) (65 mL/Hr) IV Continuous <Continuous>  finasteride 5 milliGRAM(s) Oral daily  folic acid 1 milliGRAM(s) Oral daily  insulin lispro (ADMELOG) corrective regimen sliding scale   SubCutaneous every 6 hours  insulin lispro (ADMELOG) corrective regimen sliding scale   SubCutaneous at bedtime  pantoprazole  Injectable 40 milliGRAM(s) IV Push every 12 hours  tamsulosin 0.4 milliGRAM(s) Oral at bedtime    MEDICATIONS  (PRN):  acetaminophen     Tablet .. 650 milliGRAM(s) Oral every 6 hours PRN Temp greater or equal to 38C (100.4F), Mild Pain (1 - 3)  aluminum hydroxide/magnesium hydroxide/simethicone Suspension 30 milliLiter(s) Oral every 4 hours PRN Dyspepsia      Vital Signs Last 24 Hrs  T(C): 36.4 (10 Jul 2023 15:58), Max: 36.9 (09 Jul 2023 20:01)  T(F): 97.5 (10 Jul 2023 15:58), Max: 98.4 (09 Jul 2023 20:01)  HR: 92 (10 Jul 2023 15:58) (70 - 98)  BP: 130/73 (10 Jul 2023 15:58) (100/60 - 144/86)  BP(mean): --  RR: 19 (10 Jul 2023 15:58) (17 - 19)  SpO2: 97% (10 Jul 2023 15:58) (96% - 99%)    Parameters below as of 10 Jul 2023 15:58  Patient On (Oxygen Delivery Method): room air        PHYSICAL EXAMINATION:  GENERAL: NAD, well built  HEAD:  Atraumatic, Normocephalic  EYES:  conjunctiva and sclera clear  NECK: Supple, No JVD, Normal thyroid  CHEST/LUNG: Clear to auscultation. Clear to percussion bilaterally; No rales, rhonchi, wheezing, or rubs  HEART: Regular rate and rhythm; No murmurs, rubs, or gallops  ABDOMEN: Soft, Nontender, Nondistended; Bowel sounds present, no pain or masses on palpation  FAIZA: maroon-colored stool in vault  NERVOUS SYSTEM:  Alert & Oriented X3  : voiding well  EXTREMITIES:  2+ Peripheral Pulses, No clubbing, cyanosis, or edema  SKIN: warm dry                          9.1    9.44  )-----------( 472      ( 10 Jul 2023 14:45 )             29.1     07-10    139  |  106  |  13  ----------------------------<  144<H>  4.1   |  29  |  0.67    Ca    8.7      10 Jul 2023 06:27  Phos  2.9     07-10  Mg     1.7     07-10    TPro  5.9<L>  /  Alb  2.6<L>  /  TBili  0.3  /  DBili  x   /  AST  32  /  ALT  27  /  AlkPhos  82  07-10    LIVER FUNCTIONS - ( 10 Jul 2023 06:27 )  Alb: 2.6 g/dL / Pro: 5.9 g/dL / ALK PHOS: 82 U/L / ALT: 27 U/L DA / AST: 32 U/L / GGT: x                   I&O's Summary    09 Jul 2023 07:01  -  10 Jul 2023 07:00  --------------------------------------------------------  IN: 1330 mL / OUT: 0 mL / NET: 1330 mL    10 Jul 2023 07:01  -  10 Jul 2023 16:01  --------------------------------------------------------  IN: 200 mL / OUT: 0 mL / NET: 200 mL            CAPILLARY BLOOD GLUCOSE      RADIOLOGY & ADDITIONAL TESTS:     from: CT Abdomen and Pelvis w/ IV Cont (07.10.23 @ 08:45)   IMPRESSION:  Pain colonic diverticulosis without diverticulitis. In light of rectal   bleeding, recommend colonoscopy.    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    Question cirrhosis. Atrophic right lobe versus postoperative change.    Enlarged prostate.

## 2023-07-10 NOTE — PROGRESS NOTE ADULT - PROBLEM SELECTOR PLAN 3
08-Mar-2022 10:57 h/o HTN on losartan/ HCTZ 100/12.5 qd  Monitor BP  hold home meds in the setting of GIB  restart medications if pt is hypertensive h/o HTN on losartan/ HCTZ 100/12.5 qd  hold home meds in the setting of GIB  BP controlled off meds for now  Monitor BP

## 2023-07-10 NOTE — PROGRESS NOTE ADULT - ASSESSMENT
83 y/o M, from [facility], ambulates with a walker and cane at baseline, w PMH of HTN, HLD, DM, BPH, CAD, folate deficiency, glaucoma p/w rectal bleeding since 07/03/2023. He states that he has been having bright red blood per rectum since Monday 7/3/23. Pt is admitted for lower GI bleed and acute anemia.

## 2023-07-10 NOTE — DIETITIAN INITIAL EVALUATION ADULT - PERTINENT LABORATORY DATA
07-10    139  |  106  |  13  ----------------------------<  144<H>  4.1   |  29  |  0.67    Ca    8.7      10 Jul 2023 06:27  Phos  2.9     07-10  Mg     1.7     07-10    TPro  5.9<L>  /  Alb  2.6<L>  /  TBili  0.3  /  DBili  x   /  AST  32  /  ALT  27  /  AlkPhos  82  07-10  POCT Blood Glucose.: 146 mg/dL (07-10-23 @ 11:23)  A1C with Estimated Average Glucose Result: 6.8 % (07-09-23 @ 06:04)

## 2023-07-10 NOTE — PROGRESS NOTE ADULT - PROBLEM SELECTOR PLAN 2
p/w Hb 8.2 unknown baseline  hgb downtrended from 8.7-8.0  pt was last given a unit of blood on 7/8/23  monitor CBC q6h  type and screen every 3 days - next 7/12  Transfuse as needed if hg drops <7

## 2023-07-10 NOTE — PROGRESS NOTE ADULT - PROBLEM SELECTOR PLAN 5
- pt takes rosuvastatin 20 mg at home  - c/w atorvastatin 80 mg   - f/u lipid profile  - Dash Diet when not NPO pt takes rosuvastatin 20 mg at home  - LDL noted 36  - reduce atorvastatin to 10 mg pt takes rosuvastatin 20 mg at home  - interchange to atorvastatin 80 mg daily

## 2023-07-10 NOTE — PROGRESS NOTE ADULT - PROBLEM SELECTOR PLAN 1
p/w dark black tarry stool and BRBPR since 07/03  CT A/P showing diverticulosis   no hx NSAIDs abuse  NPO  c/w D5 L5 65 cc/hr   c/w IV Protonix BID   GI Dr. Morrissey consulted, apprec recs

## 2023-07-11 ENCOUNTER — RESULT REVIEW (OUTPATIENT)
Age: 83
End: 2023-07-11

## 2023-07-11 ENCOUNTER — TRANSCRIPTION ENCOUNTER (OUTPATIENT)
Age: 83
End: 2023-07-11

## 2023-07-11 DIAGNOSIS — K20.90 ESOPHAGITIS, UNSPECIFIED WITHOUT BLEEDING: ICD-10-CM

## 2023-07-11 DIAGNOSIS — K57.90 DIVERTICULOSIS OF INTESTINE, PART UNSPECIFIED, WITHOUT PERFORATION OR ABSCESS WITHOUT BLEEDING: ICD-10-CM

## 2023-07-11 DIAGNOSIS — Z02.9 ENCOUNTER FOR ADMINISTRATIVE EXAMINATIONS, UNSPECIFIED: ICD-10-CM

## 2023-07-11 LAB
ALBUMIN SERPL ELPH-MCNC: 2.7 G/DL — LOW (ref 3.5–5)
ALP SERPL-CCNC: 93 U/L — SIGNIFICANT CHANGE UP (ref 40–120)
ALT FLD-CCNC: 31 U/L DA — SIGNIFICANT CHANGE UP (ref 10–60)
ANION GAP SERPL CALC-SCNC: 3 MMOL/L — LOW (ref 5–17)
AST SERPL-CCNC: 38 U/L — SIGNIFICANT CHANGE UP (ref 10–40)
BILIRUB SERPL-MCNC: 0.5 MG/DL — SIGNIFICANT CHANGE UP (ref 0.2–1.2)
BUN SERPL-MCNC: 10 MG/DL — SIGNIFICANT CHANGE UP (ref 7–18)
CALCIUM SERPL-MCNC: 8.9 MG/DL — SIGNIFICANT CHANGE UP (ref 8.4–10.5)
CHLORIDE SERPL-SCNC: 104 MMOL/L — SIGNIFICANT CHANGE UP (ref 96–108)
CO2 SERPL-SCNC: 33 MMOL/L — HIGH (ref 22–31)
CREAT SERPL-MCNC: 0.67 MG/DL — SIGNIFICANT CHANGE UP (ref 0.5–1.3)
EGFR: 93 ML/MIN/1.73M2 — SIGNIFICANT CHANGE UP
GLUCOSE BLDC GLUCOMTR-MCNC: 136 MG/DL — HIGH (ref 70–99)
GLUCOSE BLDC GLUCOMTR-MCNC: 143 MG/DL — HIGH (ref 70–99)
GLUCOSE BLDC GLUCOMTR-MCNC: 150 MG/DL — HIGH (ref 70–99)
GLUCOSE BLDC GLUCOMTR-MCNC: 156 MG/DL — HIGH (ref 70–99)
GLUCOSE SERPL-MCNC: 143 MG/DL — HIGH (ref 70–99)
HCT VFR BLD CALC: 25.1 % — LOW (ref 39–50)
HGB BLD-MCNC: 8 G/DL — LOW (ref 13–17)
INR BLD: 1.17 RATIO — HIGH (ref 0.88–1.16)
MAGNESIUM SERPL-MCNC: 1.5 MG/DL — LOW (ref 1.6–2.6)
MCHC RBC-ENTMCNC: 27.9 PG — SIGNIFICANT CHANGE UP (ref 27–34)
MCHC RBC-ENTMCNC: 31.9 GM/DL — LOW (ref 32–36)
MCV RBC AUTO: 87.5 FL — SIGNIFICANT CHANGE UP (ref 80–100)
NRBC # BLD: 0 /100 WBCS — SIGNIFICANT CHANGE UP (ref 0–0)
PHOSPHATE SERPL-MCNC: 3.2 MG/DL — SIGNIFICANT CHANGE UP (ref 2.5–4.5)
PLATELET # BLD AUTO: 415 K/UL — HIGH (ref 150–400)
POTASSIUM SERPL-MCNC: 4.1 MMOL/L — SIGNIFICANT CHANGE UP (ref 3.5–5.3)
POTASSIUM SERPL-SCNC: 4.1 MMOL/L — SIGNIFICANT CHANGE UP (ref 3.5–5.3)
PROT SERPL-MCNC: 5.7 G/DL — LOW (ref 6–8.3)
PROTHROM AB SERPL-ACNC: 13.9 SEC — HIGH (ref 10.5–13.4)
RBC # BLD: 2.87 M/UL — LOW (ref 4.2–5.8)
RBC # FLD: 14.6 % — HIGH (ref 10.3–14.5)
SODIUM SERPL-SCNC: 140 MMOL/L — SIGNIFICANT CHANGE UP (ref 135–145)
WBC # BLD: 9.24 K/UL — SIGNIFICANT CHANGE UP (ref 3.8–10.5)
WBC # FLD AUTO: 9.24 K/UL — SIGNIFICANT CHANGE UP (ref 3.8–10.5)

## 2023-07-11 PROCEDURE — 88305 TISSUE EXAM BY PATHOLOGIST: CPT | Mod: 26

## 2023-07-11 PROCEDURE — 45385 COLONOSCOPY W/LESION REMOVAL: CPT

## 2023-07-11 RX ORDER — SODIUM CHLORIDE 9 MG/ML
1000 INJECTION, SOLUTION INTRAVENOUS
Refills: 0 | Status: DISCONTINUED | OUTPATIENT
Start: 2023-07-11 | End: 2023-07-11

## 2023-07-11 RX ORDER — SODIUM CHLORIDE 9 MG/ML
1000 INJECTION, SOLUTION INTRAVENOUS
Refills: 0 | Status: DISCONTINUED | OUTPATIENT
Start: 2023-07-11 | End: 2023-07-14

## 2023-07-11 RX ORDER — ATORVASTATIN CALCIUM 80 MG/1
40 TABLET, FILM COATED ORAL AT BEDTIME
Refills: 0 | Status: DISCONTINUED | OUTPATIENT
Start: 2023-07-11 | End: 2023-07-11

## 2023-07-11 RX ORDER — ATORVASTATIN CALCIUM 80 MG/1
80 TABLET, FILM COATED ORAL AT BEDTIME
Refills: 0 | Status: DISCONTINUED | OUTPATIENT
Start: 2023-07-11 | End: 2023-07-14

## 2023-07-11 RX ORDER — MAGNESIUM SULFATE 500 MG/ML
2 VIAL (ML) INJECTION ONCE
Refills: 0 | Status: DISCONTINUED | OUTPATIENT
Start: 2023-07-11 | End: 2023-07-11

## 2023-07-11 RX ORDER — MAGNESIUM SULFATE 500 MG/ML
1 VIAL (ML) INJECTION ONCE
Refills: 0 | Status: COMPLETED | OUTPATIENT
Start: 2023-07-11 | End: 2023-07-11

## 2023-07-11 RX ADMIN — TAMSULOSIN HYDROCHLORIDE 0.4 MILLIGRAM(S): 0.4 CAPSULE ORAL at 21:29

## 2023-07-11 RX ADMIN — PANTOPRAZOLE SODIUM 40 MILLIGRAM(S): 20 TABLET, DELAYED RELEASE ORAL at 17:55

## 2023-07-11 RX ADMIN — BRIMONIDINE TARTRATE 1 DROP(S): 2 SOLUTION/ DROPS OPHTHALMIC at 05:41

## 2023-07-11 RX ADMIN — BRIMONIDINE TARTRATE 1 DROP(S): 2 SOLUTION/ DROPS OPHTHALMIC at 21:29

## 2023-07-11 RX ADMIN — SODIUM CHLORIDE 75 MILLILITER(S): 9 INJECTION, SOLUTION INTRAVENOUS at 17:49

## 2023-07-11 RX ADMIN — Medication 100 GRAM(S): at 10:05

## 2023-07-11 RX ADMIN — PANTOPRAZOLE SODIUM 40 MILLIGRAM(S): 20 TABLET, DELAYED RELEASE ORAL at 05:41

## 2023-07-11 RX ADMIN — SODIUM CHLORIDE 65 MILLILITER(S): 9 INJECTION, SOLUTION INTRAVENOUS at 05:40

## 2023-07-11 RX ADMIN — ATORVASTATIN CALCIUM 80 MILLIGRAM(S): 80 TABLET, FILM COATED ORAL at 21:29

## 2023-07-11 NOTE — DISCHARGE NOTE PROVIDER - HOSPITAL COURSE
81 y/o M, from [facility], ambulates with a walker and cane at baseline, w PMH of HTN, HLD, DM, BPH, CAD, folate deficiency, glaucoma p/w rectal bleeding since 07/03/2023. He states that he has been having bright red blood per rectum since Monday 7/3/23. Pt is admitted for lower GI bleed and acute anemia. GI consulted, EGD showed esophagitis. CT A/P showing diverticulosis, s/p 1 PRBC   colonoscopy done on 7/11 which showed ????????????  clinically improved, optimized for discharge with outpt follow up   Please note that this a brief summary of hospital course please refer to daily progress notes and consult notes for full course and events 83 y/o M, from [facility], ambulates with a walker and cane at baseline, w PMH of HTN, HLD, DM, BPH, CAD, folate deficiency, glaucoma p/w rectal bleeding since 07/03/2023.  Pt is admitted for lower GI bleed and acute anemia. EGD showed esophagitis. /p 1 PRBC  GI bleeding now resolved. Pt had a difficult colonoscopy on 7/11/23, found to have moderate diverticulosis with non-bleeding diverticula, additionally pt had polypectomy and a medium sized internal hemorrhoid.   Hospital course complicated with worsening  anemia, s/p additional PRBC on 7/12  Virtual colonoscopy unremarkable, h/h stable  clinically improved, optimized for discharge with outpt follow up   Please note that this a brief summary of hospital course please refer to daily progress notes and consult notes for full course and events

## 2023-07-11 NOTE — PROGRESS NOTE ADULT - SUBJECTIVE AND OBJECTIVE BOX
PATIENT SEEN AND EXAMINED ON :- 7/11/23  DATE OF SERVICE:   7/11/23          Interim events noted,Labs ,Radiological studies and Cardiology tests reviewed .    MR#[id number]  PATIENT NAME:-KIYA HUMPHRIES         [facility] COURSE: HPI:  83 [place] M, [place] amb[place] with a walker and cane at baseline, w PMH of HTN, HLD, DM, BPH, CAD, folate deficiency, glaucoma p/w rectal bleeding since 07/03/2023. He states that he has been having BRBPR since Monday with last episode the day of admission in the morning. Pt denies any prior episodes of GIB. He also denies any prior colonoscopies, or EGDs. He denies any family hx of colon cancer. He denies any CP, SOB, Palpitation, NV,D,Abd pain. He states that beside the lower GIB he has no other complains.  (08 Jul 2023 15:05)      INTERIM EVENTS:Patient seen at bedside ,interim events noted.      PMH -reviewed admission note, no change since admission  HEART FAILURE: Acute[ ]Chronic[ ] Systolic[ ] Diastolic[ ] Combined Systolic and Diastolic[ ]  CAD[ ] CABG[ ] PCI[ ]  DEVICES[ ] PPM[ ] ICD[ ] ILR[ ]  ATRIAL FIBRILLATION[ ] Paroxysmal[ ] Permanent[ ] CHADS2-[  ]  SAHRA[ ] CKD1[ ] CKD2[ ] CKD3[ ] CKD4[ ] ESRD[ ]  COPD[ ] HTN[ ]   DM[ ] Type1[ ] Type 2[ ]   CVA[ ] Paresis[ ]    AMBULATION: Assisted[ ] Cane/walker[ ] Independent[ ]    MEDICATIONS  (STANDING):  aspirin  chewable 81 milliGRAM(s) Oral daily  atorvastatin 80 milliGRAM(s) Oral at bedtime  brimonidine 0.2% Ophthalmic Solution 1 Drop(s) Right EYE three times a day  finasteride 5 milliGRAM(s) Oral daily  folic acid 1 milliGRAM(s) Oral daily  insulin lispro (ADMELOG) corrective regimen sliding scale   SubCutaneous at bedtime  insulin lispro (ADMELOG) corrective regimen sliding scale   SubCutaneous every 6 hours  lactated ringers. 1000 milliLiter(s) (75 mL/Hr) IV Continuous <Continuous>  pantoprazole  Injectable 40 milliGRAM(s) IV Push every 12 hours  tamsulosin 0.4 milliGRAM(s) Oral at bedtime    MEDICATIONS  (PRN):  acetaminophen     Tablet .. 650 milliGRAM(s) Oral every 6 hours PRN Temp greater or equal to 38C (100.4F), Mild Pain (1 - 3)  aluminum hydroxide/magnesium hydroxide/simethicone Suspension 30 milliLiter(s) Oral every 4 hours PRN Dyspepsia            REVIEW OF SYSTEMS:  Constitutional: [ ] fever, [ ]weight loss,  [ ]fatigue [ ]weight gain  Eyes: [ ] visual changes  Respiratory: [ ]shortness of breath;  [ ] cough, [ ]wheezing, [ ]chills, [ ]hemoptysis  Cardiovascular: [ ] chest pain, [ ]palpitations, [ ]dizziness,  [ ]leg swelling[ ]orthopnea[ ]PND  Gastrointestinal: [ ] abdominal pain, [ ]nausea, [ ]vomiting,  [ ]diarrhea [ ]Constipation [ ]Melena  Genitourinary: [ ] dysuria, [ ] hematuria [ ]Hong  Neurologic: [ ] headaches [ ] tremors[ ]weakness [ ]Paralysis Right[ ] Left[ ]  Skin: [ ] itching, [ ]burning, [ ] rashes  Endocrine: [ ] heat or cold intolerance  Musculoskeletal: [ ] joint pain or swelling; [ ] muscle, back, or extremity pain  Psychiatric: [ ] depression, [ ]anxiety, [ ]mood swings, or [ ]difficulty sleeping  Hematologic: [ ] easy bruising, [ ] bleeding gums    [ ] All remaining systems negative except as per above.   [ ]Unable to obtain.  [x] No change in ROS since admission      Vital Signs Last 24 Hrs  T(C): 37 (11 Jul 2023 19:13), Max: 37 (11 Jul 2023 19:13)  T(F): 98.6 (11 Jul 2023 19:13), Max: 98.6 (11 Jul 2023 19:13)  HR: 89 (11 Jul 2023 19:13) (89 - 108)  BP: 139/85 (11 Jul 2023 19:13) (121/77 - 166/79)  BP(mean): --  RR: 19 (11 Jul 2023 19:13) (18 - 20)  SpO2: 100% (11 Jul 2023 19:13) (95% - 100%)    Parameters below as of 11 Jul 2023 19:13  Patient On (Oxygen Delivery Method): nasal cannula  O2 Flow (L/min): 2    I&O's Summary    10 Jul 2023 07:01  -  11 Jul 2023 07:00  --------------------------------------------------------  IN: 1295 mL / OUT: 0 mL / NET: 1295 mL    11 Jul 2023 07:01  -  11 Jul 2023 23:30  --------------------------------------------------------  IN: 275 mL / OUT: 800 mL / NET: -525 mL        PHYSICAL EXAM:  General: No acute distress BMI-  HEENT: EOMI, PERRL  Neck: Supple, [ ] JVD  Lungs: Equal air entry bilaterally; [ ] rales [ ] wheezing [ ] rhonchi  Heart: Regular rate and rhythm; [x ] murmur   2/6 [ x] systolic [ ] diastolic [ ] radiation[ ] rubs [ ]  gallops  Abdomen: Nontender, bowel sounds present  Extremities: No clubbing, cyanosis, [ ] edema [ ]Pulses  equal and intact  Nervous system:  Alert & Oriented X3, no focal deficits  Psychiatric: Normal affect  Skin: No rashes or lesions    LABS:  07-11    140  |  104  |  10  ----------------------------<  143<H>  4.1   |  33<H>  |  0.67    Ca    8.9      11 Jul 2023 06:14  Phos  3.2     07-11  Mg     1.5     07-11    TPro  5.7<L>  /  Alb  2.7<L>  /  TBili  0.5  /  DBili  x   /  AST  38  /  ALT  31  /  AlkPhos  93  07-11    Creatinine Trend: 0.67<--, 0.67<--, 0.72<--, 0.81<--                        8.0    9.24  )-----------( 415      ( 11 Jul 2023 06:14 )             25.1     PT/INR - ( 11 Jul 2023 06:14 )   PT: 13.9 sec;   INR: 1.17 ratio

## 2023-07-11 NOTE — PROGRESS NOTE ADULT - PROBLEM SELECTOR PLAN 1
p/w dark black tarry stool and BRBPR since 07/03  possible diverticular bleed vs internal hemorrhoids  CT A/P showing diverticulosis   s/p colonoscopy on 7/11/23 showed moderate diverticulosis  diet advanced to full liquid for now  c/w LR 70 cc/hr  c/w IV Protonix BID   GI Dr. Morrissey following, rec additional virtual colonoscopy

## 2023-07-11 NOTE — PROGRESS NOTE ADULT - PROBLEM SELECTOR PLAN 2
p/w Hb 8.2 unknown baseline  pt was last given a unit of blood on 7/8/23  hgb stable ~8.0  no signs of active bleeding at this time, resolved  type and screen every 3 days - next 7/12  Transfuse as needed if hg drops <7

## 2023-07-11 NOTE — PROGRESS NOTE ADULT - PROBLEM SELECTOR PLAN 6
pt takes rosuvastatin 20 mg at home  - LDL noted 36  - reduce atorvastatin to 10 mg pt takes rosuvastatin 20 mg at home  interchange atorvastatin 80 mg daily

## 2023-07-11 NOTE — PROGRESS NOTE ADULT - SUBJECTIVE AND OBJECTIVE BOX
Patient is a 82y old  Male who presents with a chief complaint of GIB (10 Jul 2023 15:59)    OVERNIGHT EVENTS: no acute changes. Pt is currently preparing for colonoscopy. Pt denies any GI bleeding today.     REVIEW OF SYSTEMS:  CONSTITUTIONAL: No fever, chills  ENMT:  No difficulty hearing, no change in vision  NECK: No pain or stiffness  RESPIRATORY: No cough, SOB  CARDIOVASCULAR: No chest pain, palpitations  GASTROINTESTINAL: No abdominal pain. No nausea, vomiting, or diarrhea  GENITOURINARY: No dysuria  NEUROLOGICAL: No HA  SKIN: No itching, burning, rashes, or lesions   LYMPH NODES: No enlarged glands  ENDOCRINE: No heat or cold intolerance; No hair loss  MUSCULOSKELETAL: "I have pain in my shoulders and hands"  PSYCHIATRIC: No depression, anxiety  HEME/LYMPH: No easy bruising, or bleeding gums    T(C): 36.6 (07-11-23 @ 11:24), Max: 37 (07-10-23 @ 18:44)  HR: 90 (07-11-23 @ 11:24) (58 - 106)  BP: 121/77 (07-11-23 @ 11:24) (115/69 - 151/86)  RR: 19 (07-11-23 @ 11:24) (15 - 20)  SpO2: 100% (07-11-23 @ 11:24) (95% - 100%)  Wt(kg): --Vital Signs Last 24 Hrs  T(C): 36.6 (11 Jul 2023 11:24), Max: 37 (10 Jul 2023 18:44)  T(F): 97.9 (11 Jul 2023 11:24), Max: 98.6 (10 Jul 2023 18:44)  HR: 90 (11 Jul 2023 11:24) (58 - 106)  BP: 121/77 (11 Jul 2023 11:24) (115/69 - 151/86)  BP(mean): --  RR: 19 (11 Jul 2023 11:24) (15 - 20)  SpO2: 100% (11 Jul 2023 11:24) (95% - 100%)    Parameters below as of 11 Jul 2023 11:24  Patient On (Oxygen Delivery Method): room air        MEDICATIONS  (STANDING):  aspirin  chewable 81 milliGRAM(s) Oral daily  atorvastatin 10 milliGRAM(s) Oral at bedtime  brimonidine 0.2% Ophthalmic Solution 1 Drop(s) Right EYE three times a day  dextrose 5% + lactated ringers. 1000 milliLiter(s) (65 mL/Hr) IV Continuous <Continuous>  finasteride 5 milliGRAM(s) Oral daily  folic acid 1 milliGRAM(s) Oral daily  insulin lispro (ADMELOG) corrective regimen sliding scale   SubCutaneous at bedtime  insulin lispro (ADMELOG) corrective regimen sliding scale   SubCutaneous every 6 hours  pantoprazole  Injectable 40 milliGRAM(s) IV Push every 12 hours  tamsulosin 0.4 milliGRAM(s) Oral at bedtime    MEDICATIONS  (PRN):  acetaminophen     Tablet .. 650 milliGRAM(s) Oral every 6 hours PRN Temp greater or equal to 38C (100.4F), Mild Pain (1 - 3)  aluminum hydroxide/magnesium hydroxide/simethicone Suspension 30 milliLiter(s) Oral every 4 hours PRN Dyspepsia      PHYSICAL EXAM:  GENERAL: NAD  EYES: clear conjunctiva  ENMT: Moist mucous membranes  NECK: Supple, No JVD, Normal thyroid  CHEST/LUNG: Clear to auscultation bilaterally; No rales, rhonchi, wheezing, or rubs  HEART: S1, S2, Regular rate and rhythm  ABDOMEN: Soft, Nontender, Nondistended; Bowel sounds present  NEURO: Alert & Oriented X3  EXTREMITIES: No LE edema, no calf tenderness  LYMPH: No lymphadenopathy noted  SKIN: No rashes or lesions    Consultant(s) Notes Reviewed:  [x ] YES  [ ] NO  Care Discussed with Consultants/Other Providers [ x] YES  [ ] NO    LABS:                        8.0    9.24  )-----------( 415      ( 11 Jul 2023 06:14 )             25.1     07-11    140  |  104  |  10  ----------------------------<  143<H>  4.1   |  33<H>  |  0.67    Ca    8.9      11 Jul 2023 06:14  Phos  3.2     07-11  Mg     1.5     07-11    TPro  5.7<L>  /  Alb  2.7<L>  /  TBili  0.5  /  DBili  x   /  AST  38  /  ALT  31  /  AlkPhos  93  07-11    PT/INR - ( 11 Jul 2023 06:14 )   PT: 13.9 sec;   INR: 1.17 ratio           CAPILLARY BLOOD GLUCOSE      POCT Blood Glucose.: 156 mg/dL (11 Jul 2023 11:46)  POCT Blood Glucose.: 143 mg/dL (11 Jul 2023 05:40)  POCT Blood Glucose.: 132 mg/dL (10 Jul 2023 23:55)  POCT Blood Glucose.: 162 mg/dL (10 Jul 2023 16:52)        Urinalysis Basic - ( 11 Jul 2023 06:14 )    Color: x / Appearance: x / SG: x / pH: x  Gluc: 143 mg/dL / Ketone: x  / Bili: x / Urobili: x   Blood: x / Protein: x / Nitrite: x   Leuk Esterase: x / RBC: x / WBC x   Sq Epi: x / Non Sq Epi: x / Bacteria: x        RADIOLOGY & ADDITIONAL TESTS:  < from: EGD (07.10.23 @ 17:00) >  Impressions:        Erythema in the lower third of the esophagus compatible with esophagitis.        Normal mucosa in the whole stomach.        Normal mucosa in the cardia and fundus.        Normal mucosa in the whole examined duodenum.        < end of copied text >    Imaging Personally Reviewed:  [ ] YES  [ ] NO

## 2023-07-11 NOTE — DISCHARGE NOTE PROVIDER - PROVIDER TOKENS
PROVIDER:[TOKEN:[[id number]],FOLLOWUP:[1 week]],PROVIDER:[TOKEN:[[id number]],FOLLOWUP:[2 weeks]] PROVIDER:[TOKEN:[[id number]],FOLLOWUP:[1 week]],PROVIDER:[TOKEN:[[id number]],FOLLOWUP:[2 weeks]],PROVIDER:[TOKEN:[[id number]],FOLLOWUP:[2 weeks]]

## 2023-07-11 NOTE — PROGRESS NOTE ADULT - PROBLEM SELECTOR PLAN 1
p/w dark black tarry stool and BRBPR since 07/03  CT A/P showing diverticulosis   no hx NSAIDs abuse  NPO, will plan on advancing diet after colonoscopy  c/w D5 L5 65 cc/hr   c/w IV Protonix BID   GI Dr. Morrissey following p/w dark black tarry stool and BRBPR since 07/03  possible diverticular bleed vs internal hemorrhoids  CT A/P showing diverticulosis   s/p colonoscopy on 7/11/23 showed moderate diverticulosis  diet advanced to full liquid for now  c/w LR 70 cc/hr  c/w IV Protonix BID   GI Dr. Morrissey following, rec additional virtual colonoscopy

## 2023-07-11 NOTE — PROGRESS NOTE ADULT - ASSESSMENT
83 y/o M, from [facility], ambulates with a walker and cane at baseline, w PMH of HTN, HLD, DM, BPH, CAD, folate deficiency, glaucoma p/w rectal bleeding since 07/03/2023. He states that he has been having bright red blood per rectum since Monday 7/3/23. Pt is admitted for lower GI bleed and acute anemia. EGD showed esophagitis, pending colonoscopy. GI bleeding now resolved.  83 y/o M, from [facility], ambulates with a walker and cane at baseline, w PMH of HTN, HLD, DM, BPH, CAD, folate deficiency, glaucoma p/w rectal bleeding since 07/03/2023. He states that he has been having bright red blood per rectum since Monday 7/3/23. Pt is admitted for lower GI bleed and acute anemia. EGD showed esophagitis. GI bleeding now resolved. Pt had a difficult colonoscopy on 7/11/23, found to have moderate diverticulosis with non-bleeding diverticula, additionally pt had polypectomy and a medium sized internal hemorrhoid. Pt will have further evaluation with virtual colonoscopy.

## 2023-07-11 NOTE — CHART NOTE - NSCHARTNOTEFT_GEN_A_CORE
Patient had an episode of hypoxia under anesthesia, now requiring 3L oxygen nasal cannula post procedure, maintaining sat. 96%.   Patient does appear awake and alert, will advance diet to full liquid.   Will continue to monitor.

## 2023-07-11 NOTE — DISCHARGE NOTE PROVIDER - NSDCMRMEDTOKEN_GEN_ALL_CORE_FT
Anusol-HC 2.5% rectal cream with applicator: 1 application rectal 2 times a day  aspirin 81 mg oral tablet: 1 tab(s) orally once a day  brimonidine 0.15% ophthalmic solution: 1 application in each affected eye 3 times a day R eye  Colace 100 mg oral capsule: 2 cap(s) orally once a day  finasteride 5 mg oral tablet: 1 tab(s) orally once a day  folic acid 1 mg oral tablet: 1 tab(s) orally once a day  losartan-hydroCHLOROthiazide 100 mg-12.5 mg oral tablet: 1 tab(s) orally once a day  metFORMIN 500 mg oral tablet: 1 tab(s) orally 2 times a day  MiraLax oral powder for reconstitution: 17 gram(s) orally once a day  rosuvastatin 20 mg oral tablet: 1 tab(s) orally once a day (at bedtime)  tamsulosin 0.4 mg oral capsule: 1 cap(s) orally once a day  Tylenol 325 mg oral capsule: 1 cap(s) orally every 6 hours as needed for  mild pain   Anusol-HC 2.5% rectal cream with applicator: 1 application rectal 2 times a day  aspirin 81 mg oral tablet: 1 tab(s) orally once a day  brimonidine 0.15% ophthalmic solution: 1 application in each affected eye 3 times a day R eye  Colace 100 mg oral capsule: 2 cap(s) orally once a day  finasteride 5 mg oral tablet: 1 tab(s) orally once a day  folic acid 1 mg oral tablet: 1 tab(s) orally once a day  metFORMIN 500 mg oral tablet: 1 tab(s) orally 2 times a day  MiraLax oral powder for reconstitution: 17 gram(s) orally once a day  pantoprazole 40 mg oral delayed release tablet: 1 tab(s) orally once a day  rosuvastatin 20 mg oral tablet: 1 tab(s) orally once a day (at bedtime)  tamsulosin 0.4 mg oral capsule: 1 cap(s) orally once a day  Tylenol 325 mg oral capsule: 1 cap(s) orally every 6 hours as needed for  mild pain

## 2023-07-11 NOTE — DISCHARGE NOTE PROVIDER - NSDCCPCAREPLAN_GEN_ALL_CORE_FT
PRINCIPAL DISCHARGE DIAGNOSIS  Diagnosis: GIB (gastrointestinal bleeding)  Assessment and Plan of Treatment: You presented ot hospital with bleeding through rectum, CT scan of abdomen was done which showed that you have diverticulosis, you were given 1 unit of blood transfusion in the hospital   You were also evaluated by GI specialist and EGD showed that you have esophagitis   You also had colonoscopy done which showed ?????????????  you were able to tolerate oral food and your blood count remained stable   Take your medications as prescribed   Avoid NSAIDs unless your Health Care Provider tells you that it is ok (Aspirin, Ibuprofen, Advil, Motrin, Aleve).  Follow up with your Gastroenterologist within 1-2 weeks of discharge.        SECONDARY DISCHARGE DIAGNOSES  Diagnosis: Anemia due to acute blood loss  Assessment and Plan of Treatment: refer to problem #1 for additional information    Diagnosis: DM (diabetes mellitus)  Assessment and Plan of Treatment: Type 2 diabetes means your pancreas does not make enough insulin, or your body does not use insulin well. Insulin helps move sugar out of the blood so it can be used for energy. Diabetes cannot be cured, but it can be managed.  The goal for blood sugar levels before meals is between 80 and 130 mg/dL and 2 hours after eating is lower than 180 mg/dL.  Make healthy food choices:  Carbohydrates can raise your blood sugar level if you eat too many at one time. Examples of foods that contain carbohydrates are breads, cereals, rice, pasta, and sweets.      Diagnosis: BPH (benign prostatic hyperplasia)  Assessment and Plan of Treatment: Continue taking Proscar and Flomax as prescribed    Diagnosis: HTN (hypertension)  Assessment and Plan of Treatment: Take all medication as prescribed.  Follow up with your medical doctor for routine blood pressure monitoring at your next visit.  Notify your doctor if you have any of the following symptoms:   Dizziness, Lightheadedness, Blurry vision, Headache, Chest pain, Shortness of breath       PRINCIPAL DISCHARGE DIAGNOSIS  Diagnosis: GIB (gastrointestinal bleeding)  Assessment and Plan of Treatment: You presented ot hospital with bleeding through rectum, CT scan of abdomen was done which showed that you have diverticulosis, you were given blood transfusions in the hospital   You were also evaluated by GI specialist and EGD showed that you have esophagitis   You also had colonoscopy done which showed  moderate diverticulosis with non-bleeding diverticula, additionally pt had polypectomy and a medium sized internal hemorrhoid.   You also had a virtual colonoscopy done which was negative   you were able to tolerate oral food and your blood count remained stable   Take your medications as prescribed   Avoid NSAIDs unless your Health Care Provider tells you that it is ok (Aspirin, Ibuprofen, Advil, Motrin, Aleve).  Follow up with your Gastroenterologist within 1-2 weeks of discharge.        SECONDARY DISCHARGE DIAGNOSES  Diagnosis: HTN (hypertension)  Assessment and Plan of Treatment: Take all medication as prescribed.  Follow up with your medical doctor for routine blood pressure monitoring at your next visit.  Notify your doctor if you have any of the following symptoms:   Dizziness, Lightheadedness, Blurry vision, Headache, Chest pain, Shortness of breath      Diagnosis: Anemia due to acute blood loss  Assessment and Plan of Treatment: refer to problem #1 for additional information    Diagnosis: DM (diabetes mellitus)  Assessment and Plan of Treatment: Type 2 diabetes means your pancreas does not make enough insulin, or your body does not use insulin well. Insulin helps move sugar out of the blood so it can be used for energy. Diabetes cannot be cured, but it can be managed.  The goal for blood sugar levels before meals is between 80 and 130 mg/dL and 2 hours after eating is lower than 180 mg/dL.  Make healthy food choices:  Carbohydrates can raise your blood sugar level if you eat too many at one time. Examples of foods that contain carbohydrates are breads, cereals, rice, pasta, and sweets.      Diagnosis: Esophagitis  Assessment and Plan of Treatment: EGD was done which showed esophagitis, continue taking Protonix    Diagnosis: BPH (benign prostatic hyperplasia)  Assessment and Plan of Treatment: Continue taking Proscar and Flomax as prescribed     PRINCIPAL DISCHARGE DIAGNOSIS  Diagnosis: GIB (gastrointestinal bleeding)  Assessment and Plan of Treatment: You presented ot hospital with bleeding through rectum, CT scan of abdomen was done which showed that you have diverticulosis, you were given blood transfusions in the hospital   You were also evaluated by GI specialist and EGD showed that you have esophagitis   You also had colonoscopy done which showed  moderate diverticulosis with non-bleeding diverticula, additionally pt had polypectomy and a medium sized internal hemorrhoid.   You also had a virtual colonoscopy done which was negative   Follow up [facility] GI specialist outpatient for capsule endoscopy   you were able to tolerate oral food and your blood count remained stable   Take your medications as prescribed   Avoid NSAIDs unless your Health Care Provider tells you that it is ok (Aspirin, Ibuprofen, Advil, Motrin, Aleve).  Follow up with your Gastroenterologist within 1-2 weeks of discharge.        SECONDARY DISCHARGE DIAGNOSES  Diagnosis: HTN (hypertension)  Assessment and Plan of Treatment: Take all medication as prescribed.  Follow up with your medical doctor for routine blood pressure monitoring at your next visit.  Notify your doctor if you have any of the following symptoms:   Dizziness, Lightheadedness, Blurry vision, Headache, Chest pain, Shortness of breath      Diagnosis: Anemia due to acute blood loss  Assessment and Plan of Treatment: refer to problem #1 for additional information    Diagnosis: DM (diabetes mellitus)  Assessment and Plan of Treatment: Type 2 diabetes means your pancreas does not make enough insulin, or your body does not use insulin well. Insulin helps move sugar out of the blood so it can be used for energy. Diabetes cannot be cured, but it can be managed.  The goal for blood sugar levels before meals is between 80 and 130 mg/dL and 2 hours after eating is lower than 180 mg/dL.  Make healthy food choices:  Carbohydrates can raise your blood sugar level if you eat too many at one time. Examples of foods that contain carbohydrates are breads, cereals, rice, pasta, and sweets.      Diagnosis: Esophagitis  Assessment and Plan of Treatment: EGD was done which showed esophagitis, continue taking Protonix    Diagnosis: BPH (benign prostatic hyperplasia)  Assessment and Plan of Treatment: Continue taking Proscar and Flomax as prescribed

## 2023-07-11 NOTE — DISCHARGE NOTE PROVIDER - CARE PROVIDER_API CALL
Peyman Wilson  Internal Medicine  [address] [place] [address]  Phone: [phone]  Fax: [phone]  Follow Up Time: 1 week    Saulo Morrissey  Gastroenterology  [address] [place] [address], [address] [place] [address] [place] [address]  Phone: [phone]  Fax: [phone]  Follow Up Time: 2 weeks   Peyman Wilson  Internal Medicine  [address] [place] [address]  Phone: [phone]  Fax: [phone]  Follow Up Time: 1 week    Saulo Morrissey  Gastroenterology  [address] [facility], [address]nd [place] [address]  Phone: [phone]  Fax: [phone]  Follow Up Time: 2 weeks    Demetrius Hampton  Cardiology  [address] [place] [address]  Phone: [phone]  Fax: [phone]  Follow Up Time: 2 weeks

## 2023-07-11 NOTE — PROGRESS NOTE ADULT - ASSESSMENT
81 y/o M, from [facility], ambulates with a walker and cane at baseline, w PMH of HTN, HLD, DM, BPH, CAD, folate deficiency, glaucoma p/w rectal bleeding since 07/03/2023. He states that he has been having bright red blood per rectum since Monday 7/3/23. Pt is admitted for lower GI bleed and acute anemia. EGD showed esophagitis. GI bleeding now resolved. Pt had a difficult colonoscopy on 7/11/23, found to have moderate diverticulosis with non-bleeding diverticula, additionally pt had polypectomy and a medium sized internal hemorrhoid. Pt will have further evaluation with virtual colonoscopy.

## 2023-07-12 DIAGNOSIS — K92.2 GASTROINTESTINAL HEMORRHAGE, UNSPECIFIED: ICD-10-CM

## 2023-07-12 LAB
ALBUMIN SERPL ELPH-MCNC: 2.3 G/DL — LOW (ref 3.5–5)
ALP SERPL-CCNC: 88 U/L — SIGNIFICANT CHANGE UP (ref 40–120)
ALT FLD-CCNC: 30 U/L DA — SIGNIFICANT CHANGE UP (ref 10–60)
ANION GAP SERPL CALC-SCNC: 6 MMOL/L — SIGNIFICANT CHANGE UP (ref 5–17)
ANISOCYTOSIS BLD QL: SLIGHT — SIGNIFICANT CHANGE UP
AST SERPL-CCNC: 49 U/L — HIGH (ref 10–40)
BILIRUB SERPL-MCNC: 0.4 MG/DL — SIGNIFICANT CHANGE UP (ref 0.2–1.2)
BLD GP AB SCN SERPL QL: SIGNIFICANT CHANGE UP
BUN SERPL-MCNC: 6 MG/DL — LOW (ref 7–18)
CALCIUM SERPL-MCNC: 7.8 MG/DL — LOW (ref 8.4–10.5)
CHLORIDE SERPL-SCNC: 103 MMOL/L — SIGNIFICANT CHANGE UP (ref 96–108)
CO2 SERPL-SCNC: 28 MMOL/L — SIGNIFICANT CHANGE UP (ref 22–31)
CREAT SERPL-MCNC: 0.66 MG/DL — SIGNIFICANT CHANGE UP (ref 0.5–1.3)
EGFR: 93 ML/MIN/1.73M2 — SIGNIFICANT CHANGE UP
GLUCOSE BLDC GLUCOMTR-MCNC: 116 MG/DL — HIGH (ref 70–99)
GLUCOSE BLDC GLUCOMTR-MCNC: 136 MG/DL — HIGH (ref 70–99)
GLUCOSE BLDC GLUCOMTR-MCNC: 164 MG/DL — HIGH (ref 70–99)
GLUCOSE BLDC GLUCOMTR-MCNC: 184 MG/DL — HIGH (ref 70–99)
GLUCOSE BLDC GLUCOMTR-MCNC: 93 MG/DL — SIGNIFICANT CHANGE UP (ref 70–99)
GLUCOSE SERPL-MCNC: 157 MG/DL — HIGH (ref 70–99)
HCT VFR BLD CALC: 22.4 % — LOW (ref 39–50)
HCT VFR BLD CALC: 32.6 % — LOW (ref 39–50)
HGB BLD-MCNC: 10.2 G/DL — LOW (ref 13–17)
HGB BLD-MCNC: 6.9 G/DL — CRITICAL LOW (ref 13–17)
HYPOCHROMIA BLD QL: SLIGHT — SIGNIFICANT CHANGE UP
MAGNESIUM SERPL-MCNC: 1.6 MG/DL — SIGNIFICANT CHANGE UP (ref 1.6–2.6)
MANUAL SMEAR VERIFICATION: SIGNIFICANT CHANGE UP
MCHC RBC-ENTMCNC: 27.7 PG — SIGNIFICANT CHANGE UP (ref 27–34)
MCHC RBC-ENTMCNC: 28.1 PG — SIGNIFICANT CHANGE UP (ref 27–34)
MCHC RBC-ENTMCNC: 30.8 GM/DL — LOW (ref 32–36)
MCHC RBC-ENTMCNC: 31.3 GM/DL — LOW (ref 32–36)
MCV RBC AUTO: 89.8 FL — SIGNIFICANT CHANGE UP (ref 80–100)
MCV RBC AUTO: 90 FL — SIGNIFICANT CHANGE UP (ref 80–100)
NRBC # BLD: 0 /100 WBCS — SIGNIFICANT CHANGE UP (ref 0–0)
NRBC # BLD: 0 /100 WBCS — SIGNIFICANT CHANGE UP (ref 0–0)
OVALOCYTES BLD QL SMEAR: SLIGHT — SIGNIFICANT CHANGE UP
PHOSPHATE SERPL-MCNC: 3.1 MG/DL — SIGNIFICANT CHANGE UP (ref 2.5–4.5)
PLAT MORPH BLD: NORMAL — SIGNIFICANT CHANGE UP
PLATELET # BLD AUTO: 375 K/UL — SIGNIFICANT CHANGE UP (ref 150–400)
PLATELET # BLD AUTO: 472 K/UL — HIGH (ref 150–400)
PLATELET COUNT - ESTIMATE: NORMAL — SIGNIFICANT CHANGE UP
POIKILOCYTOSIS BLD QL AUTO: SLIGHT — SIGNIFICANT CHANGE UP
POLYCHROMASIA BLD QL SMEAR: SLIGHT — SIGNIFICANT CHANGE UP
POTASSIUM SERPL-MCNC: 3.8 MMOL/L — SIGNIFICANT CHANGE UP (ref 3.5–5.3)
POTASSIUM SERPL-SCNC: 3.8 MMOL/L — SIGNIFICANT CHANGE UP (ref 3.5–5.3)
PROT SERPL-MCNC: 5 G/DL — LOW (ref 6–8.3)
RBC # BLD: 2.49 M/UL — LOW (ref 4.2–5.8)
RBC # BLD: 3.63 M/UL — LOW (ref 4.2–5.8)
RBC # FLD: 14.6 % — HIGH (ref 10.3–14.5)
RBC # FLD: 14.7 % — HIGH (ref 10.3–14.5)
RBC BLD AUTO: ABNORMAL
SODIUM SERPL-SCNC: 137 MMOL/L — SIGNIFICANT CHANGE UP (ref 135–145)
WBC # BLD: 11.32 K/UL — HIGH (ref 3.8–10.5)
WBC # BLD: 8.88 K/UL — SIGNIFICANT CHANGE UP (ref 3.8–10.5)
WBC # FLD AUTO: 11.32 K/UL — HIGH (ref 3.8–10.5)
WBC # FLD AUTO: 8.88 K/UL — SIGNIFICANT CHANGE UP (ref 3.8–10.5)

## 2023-07-12 PROCEDURE — 99232 SBSQ HOSP IP/OBS MODERATE 35: CPT

## 2023-07-12 PROCEDURE — 71045 X-RAY EXAM CHEST 1 VIEW: CPT | Mod: 26

## 2023-07-12 RX ORDER — IOHEXOL 300 MG/ML
30 INJECTION, SOLUTION INTRAVENOUS ONCE
Refills: 0 | Status: COMPLETED | OUTPATIENT
Start: 2023-07-12 | End: 2023-07-12

## 2023-07-12 RX ORDER — POLYETHYLENE GLYCOL 3350 17 G/17G
238 POWDER, FOR SOLUTION ORAL ONCE
Refills: 0 | Status: COMPLETED | OUTPATIENT
Start: 2023-07-12 | End: 2023-07-12

## 2023-07-12 RX ORDER — FUROSEMIDE 40 MG
40 TABLET ORAL ONCE
Refills: 0 | Status: COMPLETED | OUTPATIENT
Start: 2023-07-12 | End: 2023-07-12

## 2023-07-12 RX ORDER — RADIOPAQUE PVC MARKERS/BARIUM 24MARKERS
225 CAPSULE ORAL ONCE
Refills: 0 | Status: COMPLETED | OUTPATIENT
Start: 2023-07-12 | End: 2023-07-12

## 2023-07-12 RX ADMIN — PANTOPRAZOLE SODIUM 40 MILLIGRAM(S): 20 TABLET, DELAYED RELEASE ORAL at 18:33

## 2023-07-12 RX ADMIN — Medication 1: at 12:07

## 2023-07-12 RX ADMIN — BRIMONIDINE TARTRATE 1 DROP(S): 2 SOLUTION/ DROPS OPHTHALMIC at 14:06

## 2023-07-12 RX ADMIN — IOHEXOL 30 MILLILITER(S): 300 INJECTION, SOLUTION INTRAVENOUS at 22:52

## 2023-07-12 RX ADMIN — Medication 650 MILLIGRAM(S): at 22:09

## 2023-07-12 RX ADMIN — Medication 1 MILLIGRAM(S): at 12:08

## 2023-07-12 RX ADMIN — Medication 40 MILLIGRAM(S): at 12:15

## 2023-07-12 RX ADMIN — POLYETHYLENE GLYCOL 3350 238 GRAM(S): 17 POWDER, FOR SOLUTION ORAL at 18:32

## 2023-07-12 RX ADMIN — BRIMONIDINE TARTRATE 1 DROP(S): 2 SOLUTION/ DROPS OPHTHALMIC at 22:09

## 2023-07-12 RX ADMIN — PANTOPRAZOLE SODIUM 40 MILLIGRAM(S): 20 TABLET, DELAYED RELEASE ORAL at 05:31

## 2023-07-12 RX ADMIN — ATORVASTATIN CALCIUM 80 MILLIGRAM(S): 80 TABLET, FILM COATED ORAL at 22:09

## 2023-07-12 RX ADMIN — BRIMONIDINE TARTRATE 1 DROP(S): 2 SOLUTION/ DROPS OPHTHALMIC at 05:31

## 2023-07-12 RX ADMIN — TAMSULOSIN HYDROCHLORIDE 0.4 MILLIGRAM(S): 0.4 CAPSULE ORAL at 22:08

## 2023-07-12 RX ADMIN — Medication 650 MILLIGRAM(S): at 22:52

## 2023-07-12 RX ADMIN — Medication 225 MILLILITER(S): at 18:32

## 2023-07-12 RX ADMIN — FINASTERIDE 5 MILLIGRAM(S): 5 TABLET, FILM COATED ORAL at 12:08

## 2023-07-12 NOTE — PROGRESS NOTE ADULT - SUBJECTIVE AND OBJECTIVE BOX
PATIENT SEEN AND EXAMINED ON :- 7/12/23  DATE OF SERVICE:  7/12/23           Interim events noted,Labs ,Radiological studies and Cardiology tests reviewed .    MR#[id number]  PATIENT NAME:-KIYA HUMPHRIES       [facility] COURSE: HPI:  83 [facility] M, [facility] amb[place] with a walker and cane at baseline, w PMH of HTN, HLD, DM, BPH, CAD, folate deficiency, glaucoma p/w rectal bleeding since 07/03/2023. He states that he has been having BRBPR since Monday with last episode the day of admission in the morning. Pt denies any prior episodes of GIB. He also denies any prior colonoscopies, or EGDs. He denies any family hx of colon cancer. He denies any CP, SOB, Palpitation, NV,D,Abd pain. He states that beside the lower GIB he has no other complains.  (08 Jul 2023 15:05)      INTERIM EVENTS:Patient seen at bedside ,interim events noted.      PMH -reviewed admission note, no change since admission  HEART FAILURE: Acute[ ]Chronic[ ] Systolic[ ] Diastolic[ ] Combined Systolic and Diastolic[ ]  CAD[ ] CABG[ ] PCI[ ]  DEVICES[ ] PPM[ ] ICD[ ] ILR[ ]  ATRIAL FIBRILLATION[ ] Paroxysmal[ ] Permanent[ ] CHADS2-[  ]  SAHRA[ ] CKD1[ ] CKD2[ ] CKD3[ ] CKD4[ ] ESRD[ ]  COPD[ ] HTN[ ]   DM[ ] Type1[ ] Type 2[ ]   CVA[ ] Paresis[ ]    AMBULATION: Assisted[ ] Cane/walker[ ] Independent[ ]    MEDICATIONS  (STANDING):  aspirin  chewable 81 milliGRAM(s) Oral daily  atorvastatin 80 milliGRAM(s) Oral at bedtime  brimonidine 0.2% Ophthalmic Solution 1 Drop(s) Right EYE three times a day  finasteride 5 milliGRAM(s) Oral daily  folic acid 1 milliGRAM(s) Oral daily  insulin lispro (ADMELOG) corrective regimen sliding scale   SubCutaneous at bedtime  insulin lispro (ADMELOG) corrective regimen sliding scale   SubCutaneous every 6 hours  lactated ringers. 1000 milliLiter(s) (75 mL/Hr) IV Continuous <Continuous>  pantoprazole  Injectable 40 milliGRAM(s) IV Push every 12 hours  tamsulosin 0.4 milliGRAM(s) Oral at bedtime    MEDICATIONS  (PRN):  acetaminophen     Tablet .. 650 milliGRAM(s) Oral every 6 hours PRN Temp greater or equal to 38C (100.4F), Mild Pain (1 - 3)  aluminum hydroxide/magnesium hydroxide/simethicone Suspension 30 milliLiter(s) Oral every 4 hours PRN Dyspepsia            REVIEW OF SYSTEMS:  Constitutional: [ ] fever, [ ]weight loss,  [ ]fatigue [ ]weight gain  Eyes: [ ] visual changes  Respiratory: [ ]shortness of breath;  [ ] cough, [ ]wheezing, [ ]chills, [ ]hemoptysis  Cardiovascular: [ ] chest pain, [ ]palpitations, [ ]dizziness,  [ ]leg swelling[ ]orthopnea[ ]PND  Gastrointestinal: [ ] abdominal pain, [ ]nausea, [ ]vomiting,  [ ]diarrhea [ ]Constipation [ ]Melena  Genitourinary: [ ] dysuria, [ ] hematuria [ ]Hong  Neurologic: [ ] headaches [ ] tremors[ ]weakness [ ]Paralysis Right[ ] Left[ ]  Skin: [ ] itching, [ ]burning, [ ] rashes  Endocrine: [ ] heat or cold intolerance  Musculoskeletal: [ ] joint pain or swelling; [ ] muscle, back, or extremity pain  Psychiatric: [ ] depression, [ ]anxiety, [ ]mood swings, or [ ]difficulty sleeping  Hematologic: [ ] easy bruising, [ ] bleeding gums    [ ] All remaining systems negative except as per above.   [ ]Unable to obtain.  [x] No change in ROS since admission      Vital Signs Last 24 Hrs  T(C): 36.8 (12 Jul 2023 19:50), Max: 37.3 (11 Jul 2023 23:52)  T(F): 98.2 (12 Jul 2023 19:50), Max: 99.1 (11 Jul 2023 23:52)  HR: 87 (12 Jul 2023 19:50) (67 - 98)  BP: 137/82 (12 Jul 2023 19:50) (107/65 - 137/82)  BP(mean): --  RR: 20 (12 Jul 2023 19:50) (20 - 20)  SpO2: 100% (12 Jul 2023 19:50) (98% - 100%)    Parameters below as of 12 Jul 2023 19:50  Patient On (Oxygen Delivery Method): nasal cannula  O2 Flow (L/min): 2    I&O's Summary    11 Jul 2023 07:01  -  12 Jul 2023 07:00  --------------------------------------------------------  IN: 275 mL / OUT: 800 mL / NET: -525 mL    12 Jul 2023 07:01  -  12 Jul 2023 23:27  --------------------------------------------------------  IN: 980 mL / OUT: 2050 mL / NET: -1070 mL        PHYSICAL EXAM:  General: No acute distress BMI-  HEENT: EOMI, PERRL  Neck: Supple, [ ] JVD  Lungs: Equal air entry bilaterally; [ ] rales [ ] wheezing [ ] rhonchi  Heart: Regular rate and rhythm; [x ] murmur   2/6 [ x] systolic [ ] diastolic [ ] radiation[ ] rubs [ ]  gallops  Abdomen: Nontender, bowel sounds present  Extremities: No clubbing, cyanosis, [ ] edema [ ]Pulses  equal and intact  Nervous system:  Alert & Oriented X3, no focal deficits  Psychiatric: Normal affect  Skin: No rashes or lesions    LABS:  07-12    137  |  103  |  6<L>  ----------------------------<  157<H>  3.8   |  28  |  0.66    Ca    7.8<L>      12 Jul 2023 05:03  Phos  3.1     07-12  Mg     1.6     07-12    TPro  5.0<L>  /  Alb  2.3<L>  /  TBili  0.4  /  DBili  x   /  AST  49<H>  /  ALT  30  /  AlkPhos  88  07-12    Creatinine Trend: 0.66<--, 0.67<--, 0.67<--, 0.72<--, 0.81<--                        10.2   11.32 )-----------( 472      ( 12 Jul 2023 18:20 )             32.6     PT/INR - ( 11 Jul 2023 06:14 )   PT: 13.9 sec;   INR: 1.17 ratio

## 2023-07-12 NOTE — PROGRESS NOTE ADULT - ASSESSMENT
81 y/o M, from [facility], ambulates with a walker and cane at baseline, w PMH of HTN, HLD, DM, BPH, CAD, folate deficiency, glaucoma p/w rectal bleeding since 07/03/2023.  Pt is admitted for lower GI bleed and acute anemia. EGD showed esophagitis. /p 1 PRBC  GI bleeding now resolved. Pt had a difficult colonoscopy on 7/11/23, found to have moderate diverticulosis with non-bleeding diverticula, additionally pt had polypectomy and a medium sized internal hemorrhoid. Pending further evaluation with virtual colonoscopy.   Hospital course complicated with worsening  anemia, s/p 1 PRBC today,

## 2023-07-12 NOTE — PROGRESS NOTE ADULT - PROBLEM SELECTOR PLAN 2
-p/w Hb 8.2 unknown baseline, hg trending low  -s/p 1 PRBC on 7/8, transfuse 1 PRBC today   -mon CBC Q12hrs  -rest plan as above -p/w Hb 8.2 unknown baseline, hg trending low  -s/p 1 PRBC on 7/8, transfuse 1 PRBC today   -mon CBC Q8hrs  -rest plan as above

## 2023-07-12 NOTE — PROGRESS NOTE ADULT - PROBLEM SELECTOR PLAN 12
-discharge likely back to [facility] pending virtual colonoscopy and improvement in anemia

## 2023-07-12 NOTE — PROGRESS NOTE ADULT - SUBJECTIVE AND OBJECTIVE BOX
GI Progress Note    Patient is a 82y old  Male who presents with a chief complaint of GIB (12 Jul 2023 14:12)    GI was consulted for GIB.    24-HOUR INTERVAL EVENTS: Patient resting in bed, on NC, denies n/v/d, cp, sob, abd pain/tenderness. No BM. +flatus. s/p EGD 7/10 and Colonoscopy 7/11. Discussed results. Hgb this morning 6.9 however no overt signs of bleeding. Received 1u PRBC, planned for post-transfusion labs @ 1600.     MEDICATIONS  (STANDING):  aspirin  chewable 81 milliGRAM(s) Oral daily  atorvastatin 80 milliGRAM(s) Oral at bedtime  barium sulfate 2% Suspension 225 milliLiter(s) Oral once  brimonidine 0.2% Ophthalmic Solution 1 Drop(s) Right EYE three times a day  finasteride 5 milliGRAM(s) Oral daily  folic acid 1 milliGRAM(s) Oral daily  insulin lispro (ADMELOG) corrective regimen sliding scale   SubCutaneous at bedtime  insulin lispro (ADMELOG) corrective regimen sliding scale   SubCutaneous every 6 hours  iohexol 300 mG (iodine)/mL Oral Solution 30 milliLiter(s) Oral once  lactated ringers. 1000 milliLiter(s) (75 mL/Hr) IV Continuous <Continuous>  pantoprazole  Injectable 40 milliGRAM(s) IV Push every 12 hours  polyethylene glycol 3350 238 Gram(s) Oral once  tamsulosin 0.4 milliGRAM(s) Oral at bedtime    MEDICATIONS  (PRN):  acetaminophen     Tablet .. 650 milliGRAM(s) Oral every 6 hours PRN Temp greater or equal to 38C (100.4F), Mild Pain (1 - 3)  aluminum hydroxide/magnesium hydroxide/simethicone Suspension 30 milliLiter(s) Oral every 4 hours PRN Dyspepsia    __________________________________________________  REVIEW OF SYSTEMS:  A detailed set of ROS were asked and negative except those outlined in GI HPI above/below.   ________________________________________________  PHYSICAL EXAM    Vital Signs Last 24 Hrs  T(C): 37.3 (12 Jul 2023 12:45), Max: 37.3 (11 Jul 2023 23:52)  T(F): 99.1 (12 Jul 2023 12:45), Max: 99.1 (11 Jul 2023 23:52)  HR: 85 (12 Jul 2023 12:45) (85 - 108)  BP: 113/75 (12 Jul 2023 12:45) (107/65 - 166/79)  BP(mean): --  RR: 20 (12 Jul 2023 12:45) (18 - 20)  SpO2: 98% (12 Jul 2023 12:45) (96% - 100%)    Parameters below as of 12 Jul 2023 12:45    O2 Flow (L/min): 2      GEN: NAD  HEENT: EOMI, conjunctivae anicteric, neck supple, moist mucous membranes  PULM: LCTAB, no wheezing, rales, or rhonchi  CV: RRR, no m/r/g  GI: soft, NT, ND; +BS in all four quadrants, no ascites, no Weeks's sign  MSK: CORLEY, no edema  NEURO: A&O x 3, no gross deficits  _________________________________________________  LABS:                        6.9    8.88  )-----------( 375      ( 12 Jul 2023 05:03 )             22.4     07-12    137  |  103  |  6<L>  ----------------------------<  157<H>  3.8   |  28  |  0.66    Ca    7.8<L>      12 Jul 2023 05:03  Phos  3.1     07-12  Mg     1.6     07-12    TPro  5.0<L>  /  Alb  2.3<L>  /  TBili  0.4  /  DBili  x   /  AST  49<H>  /  ALT  30  /  AlkPhos  88  07-12    PT/INR - ( 11 Jul 2023 06:14 )   PT: 13.9 sec;   INR: 1.17 ratio           Urinalysis Basic - ( 12 Jul 2023 05:03 )    Color: x / Appearance: x / SG: x / pH: x  Gluc: 157 mg/dL / Ketone: x  / Bili: x / Urobili: x   Blood: x / Protein: x / Nitrite: x   Leuk Esterase: x / RBC: x / WBC x   Sq Epi: x / Non Sq Epi: x / Bacteria: x      CAPILLARY BLOOD GLUCOSE      POCT Blood Glucose.: 184 mg/dL (12 Jul 2023 11:16)  POCT Blood Glucose.: 136 mg/dL (12 Jul 2023 07:46)  POCT Blood Glucose.: 164 mg/dL (12 Jul 2023 05:30)  POCT Blood Glucose.: 136 mg/dL (11 Jul 2023 21:00)  POCT Blood Glucose.: 150 mg/dL (11 Jul 2023 16:58)        RADIOLOGY & ADDITIONAL TESTS:      EGD (7/10/23):  Findings:  Esophagus Mucosa Localized erythema of the mucosa was noted in the lower third  of the esophagus. These findings are compatible with esophagitis. Grade A  esophagitis seen.  Stomach Mucosa Normal mucosa was noted in the whole stomach.  Normal mucosa was noted in the cardia and fundus. On retroflexed view, the  stomach appeared to be normal.  Duodenum Mucosa Normal mucosa was noted in the whole examined duodenum.    Impressions:  Erythema in the lower third of the esophagus compatible with esophagitis.  Normal mucosa in the whole stomach.  Normal mucosa in the cardia and fundus.  Normal mucosa in the whole examined duodenum.        Colonoscopy (7/11/23):  Findings:  Excavated lesions Multiple non-bleeding diverticula with mixed openings were  seen in the sigmoid colon, mid-sigmoid colon, distal sigmoid colon and proximal  sigmoid colon. Diverticulosis appeared to be of moderate severity.  Protruding lesions Three sessile polyps of benign appearance ranging in size  from 2 mm to 4 mm were found in the descending colon.A single-piece polypectomy  was performed using a cold snare. The polyps were completely removed.  Additional findings Medium sized int hemm.  Although valve was visualized, could not get tip into cecal caput. Likely normal  but could not advance due to long redundant colon..    Impressions:  Polyps (2 mm to 4 mm) in the descending colon. (Polypectomy).  Moderate diverticulosis of the sigmoid colon, mid-sigmoid colon, distal  sigmoid colon and proximal sigmoid colon.  Medium sized int hemm.  Although valve was visualized, could not get tip into cecal caput. Likely  normal but could not advance due to long redundant colon. .

## 2023-07-12 NOTE — PROGRESS NOTE ADULT - PROBLEM SELECTOR PLAN 12
-discharge likely back to [facility] pending virtual colonoscopy and improvement in anemia

## 2023-07-12 NOTE — PROGRESS NOTE ADULT - ASSESSMENT
83 y/o M, from [facility], ambulates with a walker and cane at baseline, w PMH of HTN, HLD, DM, BPH, CAD, folate deficiency, glaucoma p/w rectal bleeding since 07/03/2023.  Pt is admitted for lower GI bleed and acute anemia. EGD showed esophagitis. /p 1 PRBC  GI bleeding now resolved. Pt had a difficult colonoscopy on 7/11/23, found to have moderate diverticulosis with non-bleeding diverticula, additionally pt had polypectomy and a medium sized internal hemorrhoid. Pending further evaluation with virtual colonoscopy.   Hospital course complicated with worsening  anemia, s/p 1 PRBC today,

## 2023-07-12 NOTE — PROGRESS NOTE ADULT - PROBLEM SELECTOR PLAN 5
-on losartan/ HCTZ 100/12.5  at home   -BP controlled off meds , restart home meds if needed   -mon BP

## 2023-07-12 NOTE — PROGRESS NOTE ADULT - PROBLEM SELECTOR PLAN 2
-p/w Hb 8.2 unknown baseline, hg trending low  -s/p 1 PRBC on 7/8, transfuse 1 PRBC today   -mon CBC Q8hrs  -rest plan as above

## 2023-07-12 NOTE — PROGRESS NOTE ADULT - ASSESSMENT
Patient is an 82M with a PMHx of HTN, HLD, DM, BPH, CAD, folate deficiency, glaucoma, who presented with rectal bleeding. GI was consulted for GIB.     #GIB  #Anemia  #Dark stools  #Diverticulosis  #Polyps   #Esophagitis  #Cirrhosis  Patient presented with BRBPR x 5 days, Hgb on admission 8.0, s/p 1u PRBC -> Hgb 8.4. Had recurrent dark tarry stools with notable drop in Hgb from 8.7 to 8. He underwent EGD on 7/10 notable for Grade A esophagitis, and colonoscopy 7/11 notable for nonbleeding diverticula throughout sigmoid colon, and three sessile polyps in descending colon that were removed, as well as a medium sized internal hemorrhoid. Of note, patient had a hypoxic episode intra-procedure however improved with supplemental O2. This morning, he had a drop in Hgb 6.9, BUN:Cr 9, as well as sob (however denies at time of eval), CXR done showing bibasilar atelectatic infiltrates as well as incidental bowel interposition under the right hemidiaphragm (abd exam benign, patient denies abd pain), transfused 1u PRBC, pending post-transfusion CBC. Patient denies BM, +flatus, no overt signs of bleeding.     	- s/p 2u PRBC since admission  	- Please f/u post-transfusion CBC at 1600  	- Monitor for recurrent bleeding  	- Await path  	- Maintain active T&S, 2 large bore peripheral IVs, transfuse for goal Hgb >7 or if symptomatic  	- Trend H/H  	- Daily PT/INR  	- Continue IV Protonix 40mg BID  	- Consider virtual colonscopy to visualize cecal caput likely diverticular  	- Consider CT Angio A/P to localize source of bleed if recurrent episode or clinical status changes    This note and its recommendations herein are preliminary until such time as cosigned by an attending.    GI will sign off at this time.  Thank you for involving us in the care of Mr. Keith Morales.  Please re-consult GI PRN.

## 2023-07-12 NOTE — PROGRESS NOTE ADULT - PROBLEM SELECTOR PLAN 1
-p/w dark stools  -CT A/P showing diverticulosis   -s/p EGD which showed esophagitis   -s/p colonoscopy on 7/11/23 showed moderate diverticulosis  -clear liquids today, NPO after midnight   -for virtual colonoscopy in AM   -transfuse 1 PRBC today   -mon CBC   -GI Dr. Morrissey

## 2023-07-12 NOTE — PROGRESS NOTE ADULT - SUBJECTIVE AND OBJECTIVE BOX
Patient is a 82y old  Male who presents with a chief complaint of GIB (11 Jul 2023 16:19)    OVERNIGHT EVENTS: hg trending down, pt for virtual colonoscopy tomorrow       REVIEW OF SYSTEMS:  CONSTITUTIONAL: No fever, chills  NECK: No pain or stiffness  RESPIRATORY: No cough, SOB  CARDIOVASCULAR: No chest pain, palpitations  GASTROINTESTINAL: No abdominal pain. No nausea, vomiting, or diarrhea  GENITOURINARY: No dysuria  NEUROLOGICAL: No HA  MUSCULOSKELETAL: No joint pain or swelling      T(C): 37.3 (07-12-23 @ 12:45), Max: 37.3 (07-11-23 @ 23:52)  HR: 85 (07-12-23 @ 12:45) (85 - 108)  BP: 113/75 (07-12-23 @ 12:45) (107/65 - 166/79)  RR: 20 (07-12-23 @ 12:45) (18 - 20)  SpO2: 98% (07-12-23 @ 12:45) (96% - 100%)  Wt(kg): --Vital Signs Last 24 Hrs  T(C): 37.3 (12 Jul 2023 12:45), Max: 37.3 (11 Jul 2023 23:52)  T(F): 99.1 (12 Jul 2023 12:45), Max: 99.1 (11 Jul 2023 23:52)  HR: 85 (12 Jul 2023 12:45) (85 - 108)  BP: 113/75 (12 Jul 2023 12:45) (107/65 - 166/79)  BP(mean): --  RR: 20 (12 Jul 2023 12:45) (18 - 20)  SpO2: 98% (12 Jul 2023 12:45) (96% - 100%)    Parameters below as of 12 Jul 2023 12:45    O2 Flow (L/min): 2    MEDICATIONS  (STANDING):  aspirin  chewable 81 milliGRAM(s) Oral daily  atorvastatin 80 milliGRAM(s) Oral at bedtime  brimonidine 0.2% Ophthalmic Solution 1 Drop(s) Right EYE three times a day  finasteride 5 milliGRAM(s) Oral daily  folic acid 1 milliGRAM(s) Oral daily  insulin lispro (ADMELOG) corrective regimen sliding scale   SubCutaneous every 6 hours  insulin lispro (ADMELOG) corrective regimen sliding scale   SubCutaneous at bedtime  lactated ringers. 1000 milliLiter(s) (75 mL/Hr) IV Continuous <Continuous>  pantoprazole  Injectable 40 milliGRAM(s) IV Push every 12 hours  tamsulosin 0.4 milliGRAM(s) Oral at bedtime    MEDICATIONS  (PRN):  acetaminophen     Tablet .. 650 milliGRAM(s) Oral every 6 hours PRN Temp greater or equal to 38C (100.4F), Mild Pain (1 - 3)  aluminum hydroxide/magnesium hydroxide/simethicone Suspension 30 milliLiter(s) Oral every 4 hours PRN Dyspepsia      PHYSICAL EXAM:  GENERAL: NAD  EYES: clear conjunctiva  ENMT: Moist mucous membranes  NECK: Supple, No JVD  CHEST/LUNG: Clear to auscultation bilaterally; No rales, rhonchi, wheezing, or rubs  HEART: S1, S2, Regular rate and rhythm  ABDOMEN: Soft, Nontender, Nondistended; Bowel sounds present  NEURO: Alert & Oriented X3  EXTREMITIES: No LE edema, no calf tenderness  SKIN: No rashes or lesions    Consultant(s) Notes Reviewed:  [x ] YES  [ ] NO  Care Discussed with Consultants/Other Providers [ x] YES  [ ] NO    LABS:                        6.9    8.88  )-----------( 375      ( 12 Jul 2023 05:03 )             22.4     07-12    137  |  103  |  6<L>  ----------------------------<  157<H>  3.8   |  28  |  0.66    Ca    7.8<L>      12 Jul 2023 05:03  Phos  3.1     07-12  Mg     1.6     07-12    TPro  5.0<L>  /  Alb  2.3<L>  /  TBili  0.4  /  DBili  x   /  AST  49<H>  /  ALT  30  /  AlkPhos  88  07-12    PT/INR - ( 11 Jul 2023 06:14 )   PT: 13.9 sec;   INR: 1.17 ratio      CAPILLARY BLOOD GLUCOSE  POCT Blood Glucose.: 184 mg/dL (12 Jul 2023 11:16)  POCT Blood Glucose.: 136 mg/dL (12 Jul 2023 07:46)  POCT Blood Glucose.: 164 mg/dL (12 Jul 2023 05:30)  POCT Blood Glucose.: 136 mg/dL (11 Jul 2023 21:00)  POCT Blood Glucose.: 150 mg/dL (11 Jul 2023 16:58)    Urinalysis Basic - ( 12 Jul 2023 05:03 )    Color: x / Appearance: x / SG: x / pH: x  Gluc: 157 mg/dL / Ketone: x  / Bili: x / Urobili: x   Blood: x / Protein: x / Nitrite: x   Leuk Esterase: x / RBC: x / WBC x   Sq Epi: x / Non Sq Epi: x / Bacteria: x    RADIOLOGY & ADDITIONAL TESTS:    < from: Xray Chest 1 View-PORTABLE IMMEDIATE (Xray Chest 1 View-PORTABLE IMMEDIATE .) (07.12.23 @ 13:48) >    ACC: [id number] EXAM:  XR CHEST PORTABLE IMMED 1V   ORDERED BY: SANDIP KATHLEEN     PROCEDURE DATE:  07/12/2023          INTERPRETATION:  AP semierect chest on July 12, 2023 at 1:01 PM. Patient   is short of breath and has a GI hemorrhage.    COMPARISON: None available.    Elevated diaphragms crowds the chest.    Heart magnified by technique.    There are bibasilar atelectatic infiltrates.    No free intraperitoneal air.    There is incidental bowel interposition under the right hemidiaphragm.    IMPRESSION: Bibasilar atelectatic infiltrates.    < end of copied text >  < from: CT Abdomen and Pelvis w/ IV Cont (07.10.23 @ 08:45) >    ACC: [id number] EXAM:  CT ABDOMEN AND PELVIS IC   ORDERED BY: JOYCE BIGGS     PROCEDURE DATE:  07/10/2023          INTERPRETATION:  CLINICAL INFORMATION: 82 years  Male with gi bleed,      to evaluate for gi malignancy. Rectal bleeding    COMPARISON: None.    CONTRAST/COMPLICATIONS:  IV Contrast: Omnipaque 350  90 cc administered   10 cc discarded  Oral Contrast: NONE  Complications: None reported at time of study completion    PROCEDURE:  CT of the Abdomen and Pelvis was performed.  Sagittal and coronal reformats were performed.    FINDINGS:  LOWER CHEST: Mild bibasilar dependent atelectasis. Bilateral gynecomastia.    LIVER: Nodular contour. Atrophic right lobe versus postoperative change.  BILE DUCTS: Normal caliber.  GALLBLADDER: Within normal limits.  SPLEEN: Within normal limits.  PANCREAS: Within normal limits.  ADRENALS: Within normal limits.  KIDNEYS/URETERS: Bilateral cysts and hypodensities too small to   characterize. No hydroureteronephrosis. Symmetrical nephrograms.    BLADDER: Decompressed.  REPRODUCTIVE ORGANS: Enlarged prostate measuring 6.7 cm transverse.    BOWEL: No bowel obstruction. Appendix is normal. Pancolonic   diverticulosis without diverticulitis. PERITONEUM: No ascites.  VESSELS: Atherosclerotic changes. J-shaped celiac artery with proximal   narrowing. 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  RETROPERITONEUM/LYMPH NODES: No lymphadenopathy.  ABDOMINAL WALL: Atrophic psoas muscles.  BONES: Degenerative changes.    IMPRESSION:  Pain colonic diverticulosis without diverticulitis. In light of rectal   bleeding, recommend colonoscopy.    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    Question cirrhosis. Atrophic right lobe versus postoperative change.    Enlarged prostate.    < end of copied text >

## 2023-07-13 LAB
ALBUMIN SERPL ELPH-MCNC: 2.8 G/DL — LOW (ref 3.5–5)
ALP SERPL-CCNC: 121 U/L — HIGH (ref 40–120)
ALT FLD-CCNC: 43 U/L DA — SIGNIFICANT CHANGE UP (ref 10–60)
ANION GAP SERPL CALC-SCNC: 8 MMOL/L — SIGNIFICANT CHANGE UP (ref 5–17)
AST SERPL-CCNC: 75 U/L — HIGH (ref 10–40)
BILIRUB SERPL-MCNC: 1.1 MG/DL — SIGNIFICANT CHANGE UP (ref 0.2–1.2)
BUN SERPL-MCNC: 6 MG/DL — LOW (ref 7–18)
CALCIUM SERPL-MCNC: 8.7 MG/DL — SIGNIFICANT CHANGE UP (ref 8.4–10.5)
CHLORIDE SERPL-SCNC: 99 MMOL/L — SIGNIFICANT CHANGE UP (ref 96–108)
CO2 SERPL-SCNC: 30 MMOL/L — SIGNIFICANT CHANGE UP (ref 22–31)
CREAT SERPL-MCNC: 0.68 MG/DL — SIGNIFICANT CHANGE UP (ref 0.5–1.3)
EGFR: 92 ML/MIN/1.73M2 — SIGNIFICANT CHANGE UP
GLUCOSE BLDC GLUCOMTR-MCNC: 107 MG/DL — HIGH (ref 70–99)
GLUCOSE BLDC GLUCOMTR-MCNC: 114 MG/DL — HIGH (ref 70–99)
GLUCOSE BLDC GLUCOMTR-MCNC: 119 MG/DL — HIGH (ref 70–99)
GLUCOSE BLDC GLUCOMTR-MCNC: 119 MG/DL — HIGH (ref 70–99)
GLUCOSE BLDC GLUCOMTR-MCNC: 126 MG/DL — HIGH (ref 70–99)
GLUCOSE BLDC GLUCOMTR-MCNC: 137 MG/DL — HIGH (ref 70–99)
GLUCOSE SERPL-MCNC: 109 MG/DL — HIGH (ref 70–99)
HCT VFR BLD CALC: 29.5 % — LOW (ref 39–50)
HGB BLD-MCNC: 9.3 G/DL — LOW (ref 13–17)
MCHC RBC-ENTMCNC: 27.8 PG — SIGNIFICANT CHANGE UP (ref 27–34)
MCHC RBC-ENTMCNC: 31.5 GM/DL — LOW (ref 32–36)
MCV RBC AUTO: 88.3 FL — SIGNIFICANT CHANGE UP (ref 80–100)
NRBC # BLD: 0 /100 WBCS — SIGNIFICANT CHANGE UP (ref 0–0)
PLATELET # BLD AUTO: 413 K/UL — HIGH (ref 150–400)
POTASSIUM SERPL-MCNC: 3.7 MMOL/L — SIGNIFICANT CHANGE UP (ref 3.5–5.3)
POTASSIUM SERPL-SCNC: 3.7 MMOL/L — SIGNIFICANT CHANGE UP (ref 3.5–5.3)
PROT SERPL-MCNC: 6.1 G/DL — SIGNIFICANT CHANGE UP (ref 6–8.3)
RBC # BLD: 3.34 M/UL — LOW (ref 4.2–5.8)
RBC # FLD: 14.6 % — HIGH (ref 10.3–14.5)
SODIUM SERPL-SCNC: 137 MMOL/L — SIGNIFICANT CHANGE UP (ref 135–145)
SURGICAL PATHOLOGY STUDY: SIGNIFICANT CHANGE UP
WBC # BLD: 9.07 K/UL — SIGNIFICANT CHANGE UP (ref 3.8–10.5)
WBC # FLD AUTO: 9.07 K/UL — SIGNIFICANT CHANGE UP (ref 3.8–10.5)

## 2023-07-13 PROCEDURE — 74263 CT COLONOGRAPHY SCREENING: CPT | Mod: 26

## 2023-07-13 RX ADMIN — TAMSULOSIN HYDROCHLORIDE 0.4 MILLIGRAM(S): 0.4 CAPSULE ORAL at 21:25

## 2023-07-13 RX ADMIN — Medication 1 MILLIGRAM(S): at 12:27

## 2023-07-13 RX ADMIN — PANTOPRAZOLE SODIUM 40 MILLIGRAM(S): 20 TABLET, DELAYED RELEASE ORAL at 05:28

## 2023-07-13 RX ADMIN — PANTOPRAZOLE SODIUM 40 MILLIGRAM(S): 20 TABLET, DELAYED RELEASE ORAL at 18:47

## 2023-07-13 RX ADMIN — Medication 81 MILLIGRAM(S): at 12:28

## 2023-07-13 RX ADMIN — ATORVASTATIN CALCIUM 80 MILLIGRAM(S): 80 TABLET, FILM COATED ORAL at 21:25

## 2023-07-13 RX ADMIN — FINASTERIDE 5 MILLIGRAM(S): 5 TABLET, FILM COATED ORAL at 12:27

## 2023-07-13 RX ADMIN — Medication 10 MILLIGRAM(S): at 05:29

## 2023-07-13 RX ADMIN — BRIMONIDINE TARTRATE 1 DROP(S): 2 SOLUTION/ DROPS OPHTHALMIC at 21:25

## 2023-07-13 RX ADMIN — BRIMONIDINE TARTRATE 1 DROP(S): 2 SOLUTION/ DROPS OPHTHALMIC at 15:17

## 2023-07-13 RX ADMIN — BRIMONIDINE TARTRATE 1 DROP(S): 2 SOLUTION/ DROPS OPHTHALMIC at 05:29

## 2023-07-13 NOTE — PROGRESS NOTE ADULT - PROBLEM SELECTOR PROBLEM 4
CAD (coronary artery disease)
HTN (hypertension)
CAD (coronary artery disease)
CAD (coronary artery disease)
Esophagitis
HTN (hypertension)
Esophagitis

## 2023-07-13 NOTE — PROGRESS NOTE ADULT - PROBLEM SELECTOR PLAN 11
-dvt ppx: hold of chem ppx in setting of GIB   -gi ppx: PPI
dvt ppx: hold off chemical dvt due to GIB, c/w SCD  gi ppx: ppi bid
dvt ppx: hold off chemical dvt due to GIB, c/w SCD  gi ppx: ppi bid
-dvt ppx: hold of chem ppx until GIB is ruled out, SCDs   -gi ppx: PPI
-dvt ppx: hold of chem ppx in setting of GIB   -gi ppx: PPI
-dvt ppx: hold of chem ppx until GIB is ruled out, SCDs   -gi ppx: PPI

## 2023-07-13 NOTE — PROGRESS NOTE ADULT - PROBLEM SELECTOR PROBLEM 8
DM (diabetes mellitus)
Folate deficiency
DM (diabetes mellitus)
DM (diabetes mellitus)
Folate deficiency
BPH (benign prostatic hyperplasia)
DM (diabetes mellitus)

## 2023-07-13 NOTE — PROGRESS NOTE ADULT - ASSESSMENT
83 y/o M, from [facility], ambulates with a walker and cane at baseline, w PMH of HTN, HLD, DM, BPH, CAD, folate deficiency, glaucoma p/w rectal bleeding since 07/03/2023.  Pt is admitted for lower GI bleed and acute anemia. EGD showed esophagitis. /p 1 PRBC  GI bleeding now resolved. Pt had a difficult colonoscopy on 7/11/23, found to have moderate diverticulosis with non-bleeding diverticula, additionally pt had polypectomy and a medium sized internal hemorrhoid.   Hospital course complicated with worsening  anemia, s/p additional PRBC on 7/12  Pending further evaluation with virtual colonoscopy.

## 2023-07-13 NOTE — PROGRESS NOTE ADULT - ASSESSMENT
83 y/o M, from [facility], ambulates with a walker and cane at baseline, w PMH of HTN, HLD, DM, BPH, CAD, folate deficiency, glaucoma p/w rectal bleeding since 07/03/2023.  Pt is admitted for lower GI bleed and acute anemia. EGD showed esophagitis. /p 1 PRBC  GI bleeding now resolved. Pt had a difficult colonoscopy on 7/11/23, found to have moderate diverticulosis with non-bleeding diverticula, additionally pt had polypectomy and a medium sized internal hemorrhoid.   Hospital course complicated with worsening  anemia, s/p additional PRBC on 7/12  Pending further evaluation with virtual colonoscopy.

## 2023-07-13 NOTE — PROGRESS NOTE ADULT - PROBLEM SELECTOR PLAN 1
-p/w dark stools  -CT A/P showing diverticulosis   -s/p EGD which showed esophagitis   -s/p colonoscopy on 7/11/23 showed moderate diverticulosis  -for virtual colonoscopy today   -[facility]   -GI Dr. Morrissey

## 2023-07-13 NOTE — PROGRESS NOTE ADULT - PROBLEM SELECTOR PROBLEM 6
DM (diabetes mellitus)
DM (diabetes mellitus)
HLD (hyperlipidemia)
HLD (hyperlipidemia)
CAD (coronary artery disease)
CAD (coronary artery disease)
DM (diabetes mellitus)
CAD (coronary artery disease)
CAD (coronary artery disease)

## 2023-07-13 NOTE — PHYSICAL THERAPY INITIAL EVALUATION ADULT - ADDITIONAL COMMENTS
Patient lives in building with elevator access (nursing home); ambulates with cane/walker independently.

## 2023-07-13 NOTE — PROGRESS NOTE ADULT - PROBLEM SELECTOR PLAN 8
pt has hx of BPH  takes tamsulosin and finasteride at home  c/w home meds
pt has hx of folate deficiency   pt takes folic acid supplements at home  c/w home meds
-well controlled, a1c 6.8, on Metformin at home  -[facility]   -consistent carbohydrate diet
-well controlled, a1c 6.8, on Metformin at home  -[facility]   -consistent carbohydrate diet
pt has hx of BPH  takes tamsulosin and finasteride at home  c/w home meds
pt has hx of folate deficiency   pt takes folic acid supplements at home  c/w home meds
-well controlled, a1c 6.8, on Metformin at home  -[facility]   -consistent carbohydrate diet
-well controlled, a1c 6.8, on Metformin at home  -[facility]   -consistent carbohydrate diet
pt has hx of BPH  takes tamsulosin and finasteride at home  c/w home meds

## 2023-07-13 NOTE — PROGRESS NOTE ADULT - PROBLEM SELECTOR PROBLEM 9
Glaucoma
BPH (benign prostatic hyperplasia)
BPH (benign prostatic hyperplasia)
Glaucoma
BPH (benign prostatic hyperplasia)
Glaucoma
BPH (benign prostatic hyperplasia)

## 2023-07-13 NOTE — PROGRESS NOTE ADULT - PROBLEM SELECTOR PROBLEM 5
HLD (hyperlipidemia)
CAD (coronary artery disease)
HTN (hypertension)
CAD (coronary artery disease)
HTN (hypertension)
HLD (hyperlipidemia)
HLD (hyperlipidemia)
HTN (hypertension)
HTN (hypertension)

## 2023-07-13 NOTE — PROGRESS NOTE ADULT - SUBJECTIVE AND OBJECTIVE BOX
Patient is a 82y old  Male who presents with a chief complaint of GIB (12 Jul 2023 15:28)    OVERNIGHT EVENTS: no acute events overnight, pt for virtual colonoscopy today     REVIEW OF SYSTEMS:  CONSTITUTIONAL: No fever, chills  NECK: No pain or stiffness  RESPIRATORY: No cough, SOB  CARDIOVASCULAR: No chest pain, palpitations  GASTROINTESTINAL: No abdominal pain. No nausea, vomiting, or diarrhea  GENITOURINARY: No dysuria  NEUROLOGICAL: No HA  MUSCULOSKELETAL: No joint pain or swelling    T(C): 37 (07-13-23 @ 11:22), Max: 37.3 (07-12-23 @ 12:45)  HR: 90 (07-13-23 @ 11:22) (67 - 92)  BP: 147/84 (07-13-23 @ 11:22) (113/75 - 147/88)  RR: 20 (07-13-23 @ 11:22) (20 - 20)  SpO2: 98% (07-13-23 @ 11:22) (98% - 100%)  Wt(kg): --Vital Signs Last 24 Hrs  T(C): 37 (13 Jul 2023 11:22), Max: 37.3 (12 Jul 2023 12:45)  T(F): 98.6 (13 Jul 2023 11:22), Max: 99.1 (12 Jul 2023 12:45)  HR: 90 (13 Jul 2023 11:22) (67 - 92)  BP: 147/84 (13 Jul 2023 11:22) (113/75 - 147/88)  BP(mean): --  RR: 20 (13 Jul 2023 11:22) (20 - 20)  SpO2: 98% (13 Jul 2023 11:22) (98% - 100%)    Parameters below as of 13 Jul 2023 11:22  Patient On (Oxygen Delivery Method): nasal cannula  O2 Flow (L/min): 2    MEDICATIONS  (STANDING):  aspirin  chewable 81 milliGRAM(s) Oral daily  atorvastatin 80 milliGRAM(s) Oral at bedtime  brimonidine 0.2% Ophthalmic Solution 1 Drop(s) Right EYE three times a day  finasteride 5 milliGRAM(s) Oral daily  folic acid 1 milliGRAM(s) Oral daily  insulin lispro (ADMELOG) corrective regimen sliding scale   SubCutaneous every 6 hours  insulin lispro (ADMELOG) corrective regimen sliding scale   SubCutaneous at bedtime  lactated ringers. 1000 milliLiter(s) (75 mL/Hr) IV Continuous <Continuous>  pantoprazole  Injectable 40 milliGRAM(s) IV Push every 12 hours  tamsulosin 0.4 milliGRAM(s) Oral at bedtime    MEDICATIONS  (PRN):  acetaminophen     Tablet .. 650 milliGRAM(s) Oral every 6 hours PRN Temp greater or equal to 38C (100.4F), Mild Pain (1 - 3)  aluminum hydroxide/magnesium hydroxide/simethicone Suspension 30 milliLiter(s) Oral every 4 hours PRN Dyspepsia    PHYSICAL EXAM:  GENERAL: NAD  EYES: clear conjunctiva  ENMT: Moist mucous membranes  NECK: Supple, No JVD  CHEST/LUNG: Clear to auscultation bilaterally; No rales, rhonchi, wheezing, or rubs  HEART: S1, S2, Regular rate and rhythm  ABDOMEN: Soft, Nontender, Nondistended; Bowel sounds present  NEURO: Alert & Oriented X3  EXTREMITIES: No LE edema, no calf tenderness  SKIN: No rashes or lesions    Consultant(s) Notes Reviewed:  [x ] YES  [ ] NO  Care Discussed with Consultants/Other Providers [ x] YES  [ ] NO    LABS:                        9.3    9.07  )-----------( 413      ( 13 Jul 2023 05:52 )             29.5     07-13    137  |  99  |  6<L>  ----------------------------<  109<H>  3.7   |  30  |  0.68    Ca    8.7      13 Jul 2023 05:52  Phos  3.1     07-12  Mg     1.6     07-12    TPro  6.1  /  Alb  2.8<L>  /  TBili  1.1  /  DBili  x   /  AST  75<H>  /  ALT  43  /  AlkPhos  121<H>  07-13    CAPILLARY BLOOD GLUCOSE  POCT Blood Glucose.: 137 mg/dL (13 Jul 2023 11:26)  POCT Blood Glucose.: 107 mg/dL (13 Jul 2023 07:28)  POCT Blood Glucose.: 114 mg/dL (13 Jul 2023 06:15)  POCT Blood Glucose.: 119 mg/dL (13 Jul 2023 00:06)  POCT Blood Glucose.: 116 mg/dL (12 Jul 2023 20:58)  POCT Blood Glucose.: 93 mg/dL (12 Jul 2023 16:49)    Urinalysis Basic - ( 13 Jul 2023 05:52 )  Color: x / Appearance: x / SG: x / pH: x  Gluc: 109 mg/dL / Ketone: x  / Bili: x / Urobili: x   Blood: x / Protein: x / Nitrite: x   Leuk Esterase: x / RBC: x / WBC x   Sq Epi: x / Non Sq Epi: x / Bacteria: x    RADIOLOGY & ADDITIONAL TESTS:  < from: Xray Chest 1 View-PORTABLE IMMEDIATE (Xray Chest 1 View-PORTABLE IMMEDIATE .) (07.12.23 @ 13:48) >    ACC: [id number] EXAM:  XR CHEST PORTABLE IMMED 1V   ORDERED BY: SANDIP KATHLEEN     PROCEDURE DATE:  07/12/2023          INTERPRETATION:  AP semierect chest on July 12, 2023 at 1:01 PM. Patient   is short of breath and has a GI hemorrhage.    COMPARISON: None available.    Elevated diaphragms crowds the chest.    Heart magnified by technique.    There are bibasilar atelectatic infiltrates.    No free intraperitoneal air.    There is incidental bowel interposition under the right hemidiaphragm.    IMPRESSION: Bibasilar atelectatic infiltrates.    < end of copied text >

## 2023-07-13 NOTE — PROGRESS NOTE ADULT - PROBLEM SELECTOR PROBLEM 2
Anemia
Anemia due to acute blood loss

## 2023-07-13 NOTE — PROGRESS NOTE ADULT - PROBLEM SELECTOR PROBLEM 7
HLD (hyperlipidemia)
HLD (hyperlipidemia)
DM (diabetes mellitus)
Folate deficiency
DM (diabetes mellitus)
Folate deficiency
HLD (hyperlipidemia)
HLD (hyperlipidemia)
Folate deficiency

## 2023-07-13 NOTE — PROGRESS NOTE ADULT - PROBLEM SELECTOR PLAN 2
-p/w Hb 8.2 unknown baseline, hg trending low  -s/p 1 PRBC on 7/8 and 7/12  -mon CBC  -rest plan as above

## 2023-07-13 NOTE — PROGRESS NOTE ADULT - SUBJECTIVE AND OBJECTIVE BOX
PATIENT SEEN AND EXAMINED ON :- 7/13/23  DATE OF SERVICE: 7/13/23            Interim events noted,Labs ,Radiological studies and Cardiology tests reviewed .  MR#[id number]  PATIENT NAME:-KIYA HUMPHRIES         [facility] COURSE: HPI:  83 yo M, from [place] ambulates with a walker and cane at baseline, w PMH of HTN, HLD, DM, BPH, CAD, folate deficiency, glaucoma p/w rectal bleeding since 07/03/2023. He states that he has been having BRBPR since Monday with last episode the day of admission in the morning. Pt denies any prior episodes of GIB. He also denies any prior colonoscopies, or EGDs. He denies any family hx of colon cancer. He denies any CP, SOB, Palpitation, NV,D,Abd pain. He states that beside the lower GIB he has no other complains.  (08 Jul 2023 15:05)      INTERIM EVENTS:Patient seen at bedside ,interim events noted.      PMH -reviewed admission note, no change since admission  HEART FAILURE: Acute[ ]Chronic[ ] Systolic[ ] Diastolic[ ] Combined Systolic and Diastolic[ ]  CAD[ ] CABG[ ] PCI[ ]  DEVICES[ ] PPM[ ] ICD[ ] ILR[ ]  ATRIAL FIBRILLATION[ ] Paroxysmal[ ] Permanent[ ] CHADS2-[  ]  SAHRA[ ] CKD1[ ] CKD2[ ] CKD3[ ] CKD4[ ] ESRD[ ]  COPD[ ] HTN[ ]   DM[ ] Type1[ ] Type 2[ ]   CVA[ ] Paresis[ ]    AMBULATION: Assisted[ ] Cane/walker[ ] Independent[ ]    MEDICATIONS  (STANDING):  aspirin  chewable 81 milliGRAM(s) Oral daily  atorvastatin 80 milliGRAM(s) Oral at bedtime  brimonidine 0.2% Ophthalmic Solution 1 Drop(s) Right EYE three times a day  finasteride 5 milliGRAM(s) Oral daily  folic acid 1 milliGRAM(s) Oral daily  insulin lispro (ADMELOG) corrective regimen sliding scale   SubCutaneous every 6 hours  insulin lispro (ADMELOG) corrective regimen sliding scale   SubCutaneous at bedtime  lactated ringers. 1000 milliLiter(s) (75 mL/Hr) IV Continuous <Continuous>  pantoprazole  Injectable 40 milliGRAM(s) IV Push every 12 hours  tamsulosin 0.4 milliGRAM(s) Oral at bedtime    MEDICATIONS  (PRN):  acetaminophen     Tablet .. 650 milliGRAM(s) Oral every 6 hours PRN Temp greater or equal to 38C (100.4F), Mild Pain (1 - 3)  aluminum hydroxide/magnesium hydroxide/simethicone Suspension 30 milliLiter(s) Oral every 4 hours PRN Dyspepsia            REVIEW OF SYSTEMS:  Constitutional: [ ] fever, [ ]weight loss,  [ ]fatigue [ ]weight gain  Eyes: [ ] visual changes  Respiratory: [ ]shortness of breath;  [ ] cough, [ ]wheezing, [ ]chills, [ ]hemoptysis  Cardiovascular: [ ] chest pain, [ ]palpitations, [ ]dizziness,  [ ]leg swelling[ ]orthopnea[ ]PND  Gastrointestinal: [ ] abdominal pain, [ ]nausea, [ ]vomiting,  [ ]diarrhea [ ]Constipation [ ]Melena  Genitourinary: [ ] dysuria, [ ] hematuria [ ]Hong  Neurologic: [ ] headaches [ ] tremors[ ]weakness [ ]Paralysis Right[ ] Left[ ]  Skin: [ ] itching, [ ]burning, [ ] rashes  Endocrine: [ ] heat or cold intolerance  Musculoskeletal: [ ] joint pain or swelling; [ ] muscle, back, or extremity pain  Psychiatric: [ ] depression, [ ]anxiety, [ ]mood swings, or [ ]difficulty sleeping  Hematologic: [ ] easy bruising, [ ] bleeding gums    [ ] All remaining systems negative except as per above.   [ ]Unable to obtain.  [x] No change in ROS since admission      Vital Signs Last 24 Hrs  T(C): 36.9 (13 Jul 2023 19:35), Max: 37 (13 Jul 2023 11:22)  T(F): 98.4 (13 Jul 2023 19:35), Max: 98.6 (13 Jul 2023 11:22)  HR: 96 (13 Jul 2023 19:35) (81 - 97)  BP: 136/95 (13 Jul 2023 19:35) (135/85 - 147/88)  BP(mean): --  RR: 18 (13 Jul 2023 19:35) (18 - 20)  SpO2: 94% (13 Jul 2023 19:35) (94% - 100%)    Parameters below as of 13 Jul 2023 19:35  Patient On (Oxygen Delivery Method): nasal cannula  O2 Flow (L/min): 2    I&O's Summary    12 Jul 2023 07:01  -  13 Jul 2023 07:00  --------------------------------------------------------  IN: 980 mL / OUT: 2050 mL / NET: -1070 mL    13 Jul 2023 07:01  -  13 Jul 2023 21:28  --------------------------------------------------------  IN: 230 mL / OUT: 0 mL / NET: 230 mL        PHYSICAL EXAM:  General: No acute distress BMI-  HEENT: EOMI, PERRL  Neck: Supple, [ ] JVD  Lungs: Equal air entry bilaterally; [ ] rales [ ] wheezing [ ] rhonchi  Heart: Regular rate and rhythm; [x ] murmur   2/6 [ x] systolic [ ] diastolic [ ] radiation[ ] rubs [ ]  gallops  Abdomen: Nontender, bowel sounds present  Extremities: No clubbing, cyanosis, [ ] edema [ ]Pulses  equal and intact  Nervous system:  Alert & Oriented X3, no focal deficits  Psychiatric: Normal affect  Skin: No rashes or lesions    LABS:  07-13    137  |  99  |  6<L>  ----------------------------<  109<H>  3.7   |  30  |  0.68    Ca    8.7      13 Jul 2023 05:52  Phos  3.1     07-12  Mg     1.6     07-12    TPro  6.1  /  Alb  2.8<L>  /  TBili  1.1  /  DBili  x   /  AST  75<H>  /  ALT  43  /  AlkPhos  121<H>  07-13    Creatinine Trend: 0.68<--, 0.66<--, 0.67<--, 0.67<--, 0.72<--, 0.81<--                        9.3    9.07  )-----------( 413      ( 13 Jul 2023 05:52 )             29.5

## 2023-07-13 NOTE — PROGRESS NOTE ADULT - PROBLEM SELECTOR PLAN 9
pt has hx of BPH  takes tamsulosin and finasteride at home  c/w home meds
-cont home dose Tamsulosin and Finasteride
pt has hx of glaucoma  takes brimonidine tid to R eye   c/w home meds
-cont home dose Tamsulosin and Finasteride
pt has hx of glaucoma  takes brimonidine tid to R eye   c/w home meds
pt has hx of BPH  takes tamsulosin and finasteride at home  c/w home meds
pt has hx of glaucoma  takes brimonidine tid to R eye   c/w home meds

## 2023-07-13 NOTE — PROGRESS NOTE ADULT - PROBLEM SELECTOR PLAN 1
-p/w dark stools  -CT A/P showing diverticulosis   -s/p EGD which showed esophagitis   -s/p colonoscopy on 7/11/23 showed moderate diverticulosis  -for virtual colonoscopy today   -[facility]   -GI Dr. Morrissey

## 2023-07-13 NOTE — PROGRESS NOTE ADULT - PROBLEM SELECTOR PROBLEM 10
Glaucoma
Prophylactic measure
Glaucoma

## 2023-07-13 NOTE — PROGRESS NOTE ADULT - PROBLEM SELECTOR PLAN 7
- a1c 6.8  - Patient takes Metformin at home  - will hold home medications  - will start sliding scale q6h   - glucose controlled   - diabetic diet when not NPO
-cont Statin
pt has hx of folate deficiency   pt takes folic acid supplements at home  c/w home meds
- a1c 6.8  - Patient takes Metformin at home  - will hold home medications  - will start sliding scale q6h   - glucose controlled   - diabetic diet when not NPO
-cont Statin
pt has hx of folate deficiency   pt takes folic acid supplements at home  c/w home meds
pt has hx of folate deficiency   pt takes folic acid supplements at home  c/w home meds

## 2023-07-13 NOTE — PROGRESS NOTE ADULT - TIME BILLING
- Review of records, telemetry, vital signs and daily labs.   - General and cardiovascular physical examination.  - Generation of cardiovascular treatment plan.  - Coordination of care.      Patient was seen and examined by me on 7/12/23,interim events noted,labs and radiology studies reviewed.  Demetrius Hampton MD,FACC.  [address] [place], [place] [id number].  [phone]
- Review of records, telemetry, vital signs and daily labs.   - General and cardiovascular physical examination.  - Generation of cardiovascular treatment plan.  - Coordination of care.      Patient was seen and examined by me on 7/11/23interim events noted,labs and radiology studies reviewed.  Demetrius Hampton MD,FACC.  [address] [place], [place] [id number].  [phone]
- Review of records, telemetry, vital signs and daily labs.   - General and cardiovascular physical examination.  - Generation of cardiovascular treatment plan.  - Coordination of care.      Patient was seen and examined by me on 7/10/23,interim events noted,labs and radiology studies reviewed.  Demetrius Hampton MD,FACC.  [address] [place], [place] [id number].  [phone]
- Review of records, telemetry, vital signs and daily labs.   - General and cardiovascular physical examination.  - Generation of cardiovascular treatment plan.  - Coordination of care.      Patient was seen and examined by me on 7/9/23,interim events noted,labs and radiology studies reviewed.  Demetrius Hampton MD,FACC.  [address] [place], [place] [id number].  [phone]
- Review of records, telemetry, vital signs and daily labs.   - General and cardiovascular physical examination.  - Generation of cardiovascular treatment plan.  - Coordination of care.      Patient was seen and examined by me on 7/13/23,interim events noted,labs and radiology studies reviewed.  Demetrius Hampton MD,FACC.  [address] [place], [place] [id number].  [phone]

## 2023-07-13 NOTE — PROGRESS NOTE ADULT - PROBLEM SELECTOR PLAN 4
pt has hx of CAD  pt takes ASA, statin at home  c/w home meds
h/o HTN on losartan/ HCTZ 100/12.5 qd  hold home meds in the setting of GIB  BP controlled off meds for now, will restart if needed  Monitor BP
-EGD showed esophagitis  -cont Protonix
-EGD showed esophagitis  -cont Protonix
pt has hx of CAD  pt takes ASA, statin at home  c/w home meds
-EGD showed esophagitis  -cont Protonix
pt has hx of CAD  pt takes ASA, statin at home  c/w home meds
-EGD showed esophagitis  -cont Protonix
h/o HTN on losartan/ HCTZ 100/12.5 qd  hold home meds in the setting of GIB  BP controlled off meds for now, will restart if needed  Monitor BP

## 2023-07-13 NOTE — PROGRESS NOTE ADULT - PROBLEM SELECTOR PROBLEM 3
Diverticulosis of intestine
Esophagitis
Diverticulosis of intestine
HTN (hypertension)
HTN (hypertension)
Esophagitis
Diverticulosis of intestine
Diverticulosis of intestine
HTN (hypertension)

## 2023-07-13 NOTE — PROGRESS NOTE ADULT - PROBLEM SELECTOR PLAN 10
-cont brimonidine eye drops
-cont brimonidine eye drops
dvt ppx: hold off chemical dvt due to GIB, c/w SCD  gi ppx: ppi bid
-cont brimonidine eye drops
-cont brimonidine eye drops
dvt ppx: hold off chemical dvt due to GIB, c/w SCD  gi ppx: ppi bid
pt has hx of glaucoma  takes brimonidine tid to R eye   c/w home meds
dvt ppx: hold off chemical dvt due to GIB, c/w SCD  gi ppx: ppi bid
pt has hx of glaucoma  takes brimonidine tid to R eye   c/w home meds

## 2023-07-14 ENCOUNTER — TRANSCRIPTION ENCOUNTER (OUTPATIENT)
Age: 83
End: 2023-07-14

## 2023-07-14 VITALS
TEMPERATURE: 99 F | HEART RATE: 92 BPM | DIASTOLIC BLOOD PRESSURE: 79 MMHG | SYSTOLIC BLOOD PRESSURE: 143 MMHG | OXYGEN SATURATION: 98 % | RESPIRATION RATE: 18 BRPM

## 2023-07-14 LAB
ANION GAP SERPL CALC-SCNC: 9 MMOL/L — SIGNIFICANT CHANGE UP (ref 5–17)
BUN SERPL-MCNC: 6 MG/DL — LOW (ref 7–18)
CALCIUM SERPL-MCNC: 8.5 MG/DL — SIGNIFICANT CHANGE UP (ref 8.4–10.5)
CHLORIDE SERPL-SCNC: 100 MMOL/L — SIGNIFICANT CHANGE UP (ref 96–108)
CO2 SERPL-SCNC: 29 MMOL/L — SIGNIFICANT CHANGE UP (ref 22–31)
CREAT SERPL-MCNC: 0.64 MG/DL — SIGNIFICANT CHANGE UP (ref 0.5–1.3)
EGFR: 94 ML/MIN/1.73M2 — SIGNIFICANT CHANGE UP
GLUCOSE BLDC GLUCOMTR-MCNC: 112 MG/DL — HIGH (ref 70–99)
GLUCOSE BLDC GLUCOMTR-MCNC: 245 MG/DL — HIGH (ref 70–99)
GLUCOSE SERPL-MCNC: 106 MG/DL — HIGH (ref 70–99)
HCT VFR BLD CALC: 28.4 % — LOW (ref 39–50)
HGB BLD-MCNC: 9.1 G/DL — LOW (ref 13–17)
MCHC RBC-ENTMCNC: 28 PG — SIGNIFICANT CHANGE UP (ref 27–34)
MCHC RBC-ENTMCNC: 32 GM/DL — SIGNIFICANT CHANGE UP (ref 32–36)
MCV RBC AUTO: 87.4 FL — SIGNIFICANT CHANGE UP (ref 80–100)
NRBC # BLD: 0 /100 WBCS — SIGNIFICANT CHANGE UP (ref 0–0)
PLATELET # BLD AUTO: 416 K/UL — HIGH (ref 150–400)
POTASSIUM SERPL-MCNC: 3.8 MMOL/L — SIGNIFICANT CHANGE UP (ref 3.5–5.3)
POTASSIUM SERPL-SCNC: 3.8 MMOL/L — SIGNIFICANT CHANGE UP (ref 3.5–5.3)
RBC # BLD: 3.25 M/UL — LOW (ref 4.2–5.8)
RBC # FLD: 14.6 % — HIGH (ref 10.3–14.5)
SODIUM SERPL-SCNC: 138 MMOL/L — SIGNIFICANT CHANGE UP (ref 135–145)
WBC # BLD: 8.33 K/UL — SIGNIFICANT CHANGE UP (ref 3.8–10.5)
WBC # FLD AUTO: 8.33 K/UL — SIGNIFICANT CHANGE UP (ref 3.8–10.5)

## 2023-07-14 PROCEDURE — 86850 RBC ANTIBODY SCREEN: CPT

## 2023-07-14 PROCEDURE — 83540 ASSAY OF IRON: CPT

## 2023-07-14 PROCEDURE — 96365 THER/PROPH/DIAG IV INF INIT: CPT | Mod: XU

## 2023-07-14 PROCEDURE — 85730 THROMBOPLASTIN TIME PARTIAL: CPT

## 2023-07-14 PROCEDURE — 80061 LIPID PANEL: CPT

## 2023-07-14 PROCEDURE — 83550 IRON BINDING TEST: CPT

## 2023-07-14 PROCEDURE — 85025 COMPLETE CBC W/AUTO DIFF WBC: CPT

## 2023-07-14 PROCEDURE — 74177 CT ABD & PELVIS W/CONTRAST: CPT

## 2023-07-14 PROCEDURE — 85027 COMPLETE CBC AUTOMATED: CPT

## 2023-07-14 PROCEDURE — 86900 BLOOD TYPING SEROLOGIC ABO: CPT

## 2023-07-14 PROCEDURE — 71045 X-RAY EXAM CHEST 1 VIEW: CPT

## 2023-07-14 PROCEDURE — 85045 AUTOMATED RETICULOCYTE COUNT: CPT

## 2023-07-14 PROCEDURE — 83615 LACTATE (LD) (LDH) ENZYME: CPT

## 2023-07-14 PROCEDURE — 88305 TISSUE EXAM BY PATHOLOGIST: CPT

## 2023-07-14 PROCEDURE — 83010 ASSAY OF HAPTOGLOBIN QUANT: CPT

## 2023-07-14 PROCEDURE — P9040: CPT

## 2023-07-14 PROCEDURE — 74263 CT COLONOGRAPHY SCREENING: CPT

## 2023-07-14 PROCEDURE — 85610 PROTHROMBIN TIME: CPT

## 2023-07-14 PROCEDURE — 83735 ASSAY OF MAGNESIUM: CPT

## 2023-07-14 PROCEDURE — 80053 COMPREHEN METABOLIC PANEL: CPT

## 2023-07-14 PROCEDURE — 80048 BASIC METABOLIC PNL TOTAL CA: CPT

## 2023-07-14 PROCEDURE — 99285 EMERGENCY DEPT VISIT HI MDM: CPT | Mod: 25

## 2023-07-14 PROCEDURE — 86901 BLOOD TYPING SEROLOGIC RH(D): CPT

## 2023-07-14 PROCEDURE — 36430 TRANSFUSION BLD/BLD COMPNT: CPT

## 2023-07-14 PROCEDURE — 82962 GLUCOSE BLOOD TEST: CPT

## 2023-07-14 PROCEDURE — 83036 HEMOGLOBIN GLYCOSYLATED A1C: CPT

## 2023-07-14 PROCEDURE — 36415 COLL VENOUS BLD VENIPUNCTURE: CPT

## 2023-07-14 PROCEDURE — 84100 ASSAY OF PHOSPHORUS: CPT

## 2023-07-14 PROCEDURE — 84466 ASSAY OF TRANSFERRIN: CPT

## 2023-07-14 PROCEDURE — 93005 ELECTROCARDIOGRAM TRACING: CPT

## 2023-07-14 PROCEDURE — 86923 COMPATIBILITY TEST ELECTRIC: CPT

## 2023-07-14 PROCEDURE — 82728 ASSAY OF FERRITIN: CPT

## 2023-07-14 RX ORDER — PANTOPRAZOLE SODIUM 20 MG/1
1 TABLET, DELAYED RELEASE ORAL
Qty: 0 | Refills: 0 | DISCHARGE
Start: 2023-07-14

## 2023-07-14 RX ADMIN — PANTOPRAZOLE SODIUM 40 MILLIGRAM(S): 20 TABLET, DELAYED RELEASE ORAL at 06:07

## 2023-07-14 RX ADMIN — Medication 81 MILLIGRAM(S): at 12:40

## 2023-07-14 RX ADMIN — BRIMONIDINE TARTRATE 1 DROP(S): 2 SOLUTION/ DROPS OPHTHALMIC at 06:07

## 2023-07-14 RX ADMIN — Medication 1 MILLIGRAM(S): at 12:40

## 2023-07-14 RX ADMIN — FINASTERIDE 5 MILLIGRAM(S): 5 TABLET, FILM COATED ORAL at 12:40

## 2023-07-14 RX ADMIN — Medication 2: at 12:15

## 2023-07-14 NOTE — PROGRESS NOTE ADULT - SUBJECTIVE AND OBJECTIVE BOX
GIPROGRESSNOTE    Cfndywdmbh84clloLlrheapvtqzmzudkbyopwgqqrpqkvcstmjecBVO([id number])      HPI:  82yoM,[place]andcaneatbaseline,wPMHofHTN,HLD,DM,BPH,CAD,folatedeficiency,glaucomap/mrogzdradxfjmosyelkj11/03/2023.HestatesthathehasbeenhavingBRBPRsinceMondaywithlastepisodethedayofadmissioninthemorning.PtdeniesanypriorepisodesofGIB.Healsodeniesanypriorcolonoscopies,orEGDs.Hedeniesanyfamilyhxofcoloncancer.HedeniesanyCP,SOB,Palpitation,NV,D,Abdpain.HestatesthatbesidethelowerGIBhehasnoothercomplains.([id number]:05)    GI HPI   Patient resting in bed. Denies any N&V or abdomina pain. No blood shavon ark tarry stools. HH stable.       ______________________________________________________________________  PMH/PSH:  PASTMEDICAL&SURGICALHISTORY:  HTN(hypertension)      HLD(hyperlipidemia)      DM(diabetesmellitus)      Folatedeficiency      BPH(benignprostatichyperplasia)      CAD(coronaryarterydisease)      Glaucoma      Nosignificantpastsurgicalhistory        ______________________________________________________________________  MEDS:  MEDICATIONS(STANDING):  aspirinchewable81milliGRAM(s)Oraldaily  atorvastatin80milliGRAM(s)Oralatbedtime  brimonidine0.2%HlioaiprpwIanfqvxb5Niuv(s)RightEYEthreetimesaday  finasteride5milliGRAM(s)Oraldaily  folicacid1milliGRAM(s)Oraldaily  insulinlispro(ADMELOG)correctiveregimenslidingscaleSubCutaneousevery6hours  insulinlispro(ADMELOG)correctiveregimenslidingscaleSubCutaneousatbedtime  lactatedringers.1000milliLiter(s)(75mL/Hr)IVContinuous<Continuous>  pantoprazoleInjectable40milliGRAM(s)BJXhpfiensr29higex  tamsulosin0.4milliGRAM(s)Oralatbedtime    MEDICATIONS(PRN):  acetaminophenTablet..650milliGRAM(s)Gtjjusswt9zsootTEGWpztxglbomvrqrbduiiy26F(100.4F),MildPain(1-3)  aluminumhydroxide/magnesiumhydroxide/behigxvtccfNxixagtgii05kypacFqcjt(s)Cwoupussw5vaksiKYNKloaqbumb    ______________________________________________________________________  ALL:  Allergies    NoKnownAllergies    Intolerances      ______________________________________________________________________  SH:SocialHistory:  Deniessmoking,alcohol,illicitdruguse.Livesatnursinghomeandambulateswithawalkerandcaneatbaseline([id number])    ______________________________________________________________________  FH:  FAMILYHISTORY:  Nopertinentfamilyhistoryinfirstdegreerelatives      ______________________________________________________________________  ROS:    CONSTITUTIONAL:Noweightloss,fever,chills,weaknessorfatigue.    HEENT:Eyes:Novisualloss,blurredvision,doublevisionoryellowsclerae.Ears,Nose,Throat:Nohearingloss,sneezing,congestion,runnynoseorsorethroat.    SKIN:Norashoritching.    CARDIOVASCULAR:Nochestpain,chestpressureorchestdiscomfort.Nopalpitationsoredema.    RESPIRATORY:Noshortnessofbreath,coughorsputum.    GASTROINTESTINAL:SEEHPI    GENITOURINARY:Nodysuria,hematuria,urinaryfrequency    NEUROLOGICAL:Noheadache,dizziness,syncope,paralysis,ataxia,numbnessortinglingintheextremities.Nochangeinbowelorbladdercontrol.    MUSCULOSKELETAL:Nomuscle,backpain,jointpainorstiffness.      ______________________________________________________________________  PHYSICALEXAM:  T(C):37(07-14-23@07:40),Max:37(07-13-23@11:22)  HR:90(07-14-23@07:40)  BP:160/92(07-14-23@07:40)  RR:18(07-14-23@07:40)  SpO2:96%(07-14-23@04:46)  Wt(kg):--    07-13-07-14  --------------------------------------------------------  IN:  OralFluid:530mL  TotalIN:530mL    OUT:  Voided(mL):600mL  TotalOUT:600mL    TotalNET:-70mL          GEN:NAD  CVS-S1S2  ABD:softnontender,nondistended,bowelsounds+  LUNGS:cleartoauscultation  NEURO:noinfocalneuroexam;AAOx3  Extremities:nocyanosis,nocalftenderness,noedema,noclubbing      ______________________________________________________________________  LABS:    9.1  8.33)-----------(416([id number]:15)  28.4    07-14    138|100|6<L>  ----------------------------<106<H>  3.8|29|0.64    Ca8.419Sdg707755:15    TPro6.1/Alb2.8<L>/TBili1.1/DBilix/AST75<H>/ALT43/AmnMbwx204<H>07-13    LIVERFUNCTIONS-([id number]:52)  Alb:2.8g/dL/Pro:6.1g/dL/ALKPHOS:121U/L/ALT:43U/LDA/AST:75U/L/GGT:x    ______________________________________________________________________  IMAGING:    ______________________________________________________________________  ASSESSMENT:  82yMale    PLAN:

## 2023-07-14 NOTE — DISCHARGE NOTE NURSING/CASE MANAGEMENT/SOCIAL WORK - PATIENT PORTAL LINK FT
You can access the FollowMyHealth Patient Portal offered by [facility] by registering at the following website: [email or web address]. By joining [facility]’s FollowMyHealth portal, you will also be able to view your health information using other applications (apps) compatible with our system.

## 2023-07-14 NOTE — DISCHARGE NOTE NURSING/CASE MANAGEMENT/SOCIAL WORK - NSDCPEFALRISK_GEN_ALL_CORE
For information on Fall & Injury Prevention, visit: [email or web address] OR  [email or web address] OR  https://www.cdc.gov/steadi/patient.html

## 2023-07-14 NOTE — PROGRESS NOTE ADULT - PROVIDER SPECIALTY LIST ADULT
Internal Medicine
Gastroenterology
Internal Medicine
Gastroenterology
Cardiology
Internal Medicine

## 2023-07-14 NOTE — PROGRESS NOTE ADULT - ASSESSMENT
Patient is an 82M with a PMHx of HTN, HLD, DM, BPH, CAD, folate deficiency, glaucoma, who presented with rectal bleeding. GI was consulted for GIB.     7/14: VT colonoscopy with no polyps. HH stable. No overt signs of bleeding     #GIB  #Anemia  #Dark stools  #Diverticulosis  #Polyps   #Esophagitis  #Cirrhosis  Patient presented with BRBPR x 5 days, Hgb on admission 8.0, s/p 1u PRBC -> Hgb 8.4. Had recurrent dark tarry stools with notable drop in Hgb from 8.7 to 8. He underwent EGD on 7/10 notable for Grade A esophagitis, and colonoscopy 7/11 notable for nonbleeding diverticula throughout sigmoid colon, and three sessile polyps in descending colon that were removed, as well as a medium sized internal hemorrhoid. Of note, patient had a hypoxic episode intra-procedure however improved with supplemental O2. This morning, he had a drop in Hgb 6.9, BUN:Cr 9, as well as sob (however denies at time of eval), CXR done showing bibasilar atelectatic infiltrates as well as incidental bowel interposition under the right hemidiaphragm (abd exam benign, patient denies abd pain), transfused 1u PRBC, pending post-transfusion CBC. Patient denies BM, +flatus, no overt signs of bleeding.     	  	- Monitor for recurrent bleeding  	- Maintain active T&S, 2 large bore peripheral IVs, transfuse for goal Hgb >7 or if symptomatic  	- Trend H/H  	- Daily PT/INR  	- Continue Protonix   	- Consider CT Angio A/P to localize source of bleed if recurrent episode or clinical status changes              - Outpatient follow with Dr. Mrorissey for capsule study-of small bowel to r/o AVM, ulcers Vs other

## 2023-07-18 PROBLEM — Z00.00 ENCOUNTER FOR PREVENTIVE HEALTH EXAMINATION: Status: ACTIVE | Noted: 2023-07-18

## 2023-07-25 ENCOUNTER — APPOINTMENT (OUTPATIENT)
Age: 83
End: 2023-07-25
Payer: MEDICARE

## 2023-07-25 VITALS
HEIGHT: 67 IN | WEIGHT: 184 LBS | TEMPERATURE: 97.1 F | DIASTOLIC BLOOD PRESSURE: 62 MMHG | HEART RATE: 95 BPM | BODY MASS INDEX: 28.88 KG/M2 | SYSTOLIC BLOOD PRESSURE: 98 MMHG | OXYGEN SATURATION: 98 %

## 2023-07-25 DIAGNOSIS — K92.2 GASTROINTESTINAL HEMORRHAGE, UNSPECIFIED: ICD-10-CM

## 2023-07-25 PROCEDURE — 99203 OFFICE O/P NEW LOW 30 MIN: CPT

## 2023-07-25 PROCEDURE — 99213 OFFICE O/P EST LOW 20 MIN: CPT

## 2023-07-25 RX ORDER — DOCUSATE SODIUM 100 MG/1
100 CAPSULE ORAL
Refills: 0 | Status: ACTIVE | COMMUNITY

## 2023-07-25 RX ORDER — METFORMIN HYDROCHLORIDE 500 MG/1
500 TABLET, COATED ORAL
Refills: 0 | Status: ACTIVE | COMMUNITY

## 2023-07-25 RX ORDER — FOLIC ACID 1 MG/1
1 TABLET ORAL
Refills: 0 | Status: ACTIVE | COMMUNITY

## 2023-07-25 RX ORDER — TAMSULOSIN HYDROCHLORIDE 0.4 MG/1
0.4 CAPSULE ORAL
Refills: 0 | Status: ACTIVE | COMMUNITY

## 2023-07-25 RX ORDER — HYDROCORTISONE 25 MG/G
2.5 CREAM TOPICAL
Refills: 0 | Status: ACTIVE | COMMUNITY

## 2023-07-25 RX ORDER — LOSARTAN POTASSIUM AND HYDROCHLOROTHIAZIDE 12.5; 1 MG/1; MG/1
100-12.5 TABLET ORAL
Refills: 0 | Status: ACTIVE | COMMUNITY

## 2023-07-25 RX ORDER — ASPIRIN 81 MG/1
81 TABLET, CHEWABLE ORAL
Refills: 0 | Status: ACTIVE | COMMUNITY

## 2023-07-25 RX ORDER — ROSUVASTATIN CALCIUM 20 MG/1
20 TABLET, FILM COATED ORAL
Refills: 0 | Status: ACTIVE | COMMUNITY

## 2023-07-25 RX ORDER — LORATADINE 5 MG
TABLET,CHEWABLE ORAL
Refills: 0 | Status: ACTIVE | COMMUNITY

## 2023-07-25 NOTE — PHYSICAL EXAM

## 2023-07-25 NOTE — HISTORY OF PRESENT ILLNESS
[FreeTextEntry1] : This is a 82 year old male here for post hospital discharge follow up of GIB.  PMH of HTN, HLD, DM, BPH, CAD, folate deficiency, glaucoma p/w rectal bleeding. Denies a family history of colon CA, gastric CA, high risk polyps, Inflammatory and autoimmune disease. Pt is admitted for lower GI bleed and acute anemia. EGD showed esophagitis. Pt had a difficult colonoscopy on 7/11/23, found to have moderate diverticulosis with non-bleeding diverticula, additionally pt had polypectomy and a medium sized internal hemorrhoid.  Virtual colonoscopy unremarkable. Hospital course complicated with worsening  anemia, s/p additional PRBC on 7/12. Labs from 7/14 Hgb 9.2. Currently patient denies any difficulty swallowing or reflux. No n&V or abdominal pain. No blood or dark tarry stools. Normal caliber. Weight stable.

## 2023-07-25 NOTE — ASSESSMENT
[FreeTextEntry1] : This is a 82 year old male here for post hospital discharge follow up of GIB.  PMH of HTN, HLD, DM, BPH, CAD, folate deficiency, glaucoma p/w rectal bleeding. Denies a family history of colon CA, gastric CA, high risk polyps, Inflammatory and autoimmune disease. Pt is admitted for lower GI bleed and acute anemia. EGD showed esophagitis. Pt had a difficult colonoscopy on 7/11/23, found to have moderate diverticulosis with non-bleeding diverticula, additionally pt had polypectomy and a medium sized internal hemorrhoid.  Virtual colonoscopy unremarkable. Hospital course complicated with worsening  anemia, s/p additional PRBC on 7/12. Labs from 7/14 Hgb 9.2. Currently patient denies any difficulty swallowing or reflux. No n&V or abdominal pain. No blood or dark tarry stools. Normal caliber. Weight stable. \par \par My plan \par Capsule study - prep given to pt \par CBC\par \par Total time spent to complete patient's bedside assessment, physical examination, review medical chart including labs & imaging, discuss medical plan of care with patient was more than 40 minutes.

## 2023-08-01 ENCOUNTER — APPOINTMENT (OUTPATIENT)
Dept: GASTROENTEROLOGY | Facility: CLINIC | Age: 83
End: 2023-08-01

## 2023-08-01 VITALS
TEMPERATURE: 97.9 F | BODY MASS INDEX: 28.88 KG/M2 | HEIGHT: 67 IN | OXYGEN SATURATION: 97 % | WEIGHT: 184 LBS | SYSTOLIC BLOOD PRESSURE: 117 MMHG | DIASTOLIC BLOOD PRESSURE: 69 MMHG | HEART RATE: 93 BPM

## 2023-08-03 ENCOUNTER — APPOINTMENT (OUTPATIENT)
Dept: GASTROENTEROLOGY | Facility: CLINIC | Age: 83
End: 2023-08-03
Payer: MEDICARE

## 2023-08-03 VITALS
WEIGHT: 184 LBS | SYSTOLIC BLOOD PRESSURE: 114 MMHG | OXYGEN SATURATION: 99 % | TEMPERATURE: 97.7 F | BODY MASS INDEX: 28.88 KG/M2 | DIASTOLIC BLOOD PRESSURE: 71 MMHG | HEART RATE: 88 BPM | HEIGHT: 67 IN

## 2023-08-03 DIAGNOSIS — D64.9 ANEMIA, UNSPECIFIED: ICD-10-CM

## 2023-08-03 PROCEDURE — 91110 GI TRC IMG INTRAL ESOPH-ILE: CPT

## 2023-08-07 RX ORDER — ACETAMINOPHEN 500 MG
1 TABLET ORAL
Refills: 0 | DISCHARGE

## 2023-08-07 RX ORDER — DOCUSATE SODIUM 100 MG
2 CAPSULE ORAL
Refills: 0 | DISCHARGE

## 2023-08-07 RX ORDER — ASPIRIN/CALCIUM CARB/MAGNESIUM 324 MG
1 TABLET ORAL
Refills: 0 | DISCHARGE

## 2023-08-07 RX ORDER — METFORMIN HYDROCHLORIDE 850 MG/1
1 TABLET ORAL
Refills: 0 | DISCHARGE

## 2023-08-07 RX ORDER — ROSUVASTATIN CALCIUM 5 MG/1
1 TABLET ORAL
Refills: 0 | DISCHARGE

## 2023-08-07 RX ORDER — POLYETHYLENE GLYCOL 3350 17 G/17G
17 POWDER, FOR SOLUTION ORAL
Refills: 0 | DISCHARGE

## 2023-08-07 RX ORDER — FOLIC ACID 0.8 MG
1 TABLET ORAL
Refills: 0 | DISCHARGE

## 2023-08-07 RX ORDER — LOSARTAN/HYDROCHLOROTHIAZIDE 100MG-25MG
1 TABLET ORAL
Refills: 0 | DISCHARGE

## 2023-08-07 RX ORDER — HYDROCORTISONE 1 %
1 OINTMENT (GRAM) TOPICAL
Refills: 0 | DISCHARGE

## 2023-08-07 RX ORDER — BRIMONIDINE TARTRATE 2 MG/MG
1 SOLUTION/ DROPS OPHTHALMIC
Refills: 0 | DISCHARGE

## 2023-08-07 RX ORDER — FINASTERIDE 5 MG/1
1 TABLET, FILM COATED ORAL
Refills: 0 | DISCHARGE

## 2023-08-07 RX ORDER — TAMSULOSIN HYDROCHLORIDE 0.4 MG/1
1 CAPSULE ORAL
Refills: 0 | DISCHARGE

## 2023-08-16 PROBLEM — D64.9 ANEMIA: Status: ACTIVE | Noted: 2023-07-25

## 2023-09-20 NOTE — PATIENT PROFILE ADULT - NSTRANSFERDENTURES_GEN_A_NUR
CC:  Chief Complaint   Patient presents with    Follow-Up     ER follow up        HISTORY OF PRESENT ILLNESS: Patient is a 81 y.o. male established patient presenting     Problem   Acute Deep Vein Thrombosis (Dvt) of Left Lower Extremity (Hcc)    9/15/2023  Patient was recently seen in the [facility] emergency room yesterday after having increased swelling to his left lower extremity, edema and weeping and erythema.  Patient was sent in by his home health nurse.  Patient was originally seen in ER on 8/31/2023 for a extensive LLE swelling which resulted in a  left lower extremity DVT.  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  Patient was started on Eliquis 8/31/2023 and states he did have some improvement to his left lower extremity swelling.  Then followed up with primary care on 9/5/2023 it was noted that his left lower extremity was improving for swelling.  Unfortunately patient was seen back in the emergency room on 9/14/2023 due to increase in the swelling of left lower extremity, erythema and weeping.  Patient was started on amoxicillin 500 mg 3 times daily for 7 days, continue Eliquis 5 mg twice daily.  They did complete ultrasound, EKG, BNP for possible CHF which noted none.  Patient denies any chills, fevers, nausea, vomiting.  He does have 4+ pitting edema.  It was noted in the ER visit that he did have hypoalbuminemia with a sodium of 135 albumin of 2.8 and protein of 5.8.  They have not been able to  his amoxicillin that was prescribed yesterday however we did call his pharmacy today and they do have it readily available for him to  today.  He denies any chest pain, orthopnea, dyspnea.           Review of Systems   Constitutional:  Negative for chills, fever, malaise/fatigue and weight loss.   Respiratory:  Negative for cough and shortness of breath.    Cardiovascular:  Positive for leg swelling. Negative for chest pain,  "palpitations, orthopnea and claudication.   Skin:         Left lower extremity weeping, redness, swelling, edema   Neurological: Negative.              - NOTE: All other systems reviewed and are negative, except as in HPI.      Exam:    /64 (BP Location: Right arm, Patient Position: Sitting, BP Cuff Size: Adult)   Pulse 87   Temp 36.5 °C (97.7 °F) (Temporal)   Resp 16   Ht 1.753 m (5' 9\")   Wt 111 kg (245 lb)   SpO2 99%  Body mass index is 36.18 kg/m².    Physical Exam  Constitutional:       General: He is not in acute distress.     Appearance: Normal appearance. He is not ill-appearing.   HENT:      Head: Normocephalic and atraumatic.   Cardiovascular:      Rate and Rhythm: Normal rate and regular rhythm.      Pulses: Normal pulses.      Heart sounds: Normal heart sounds.   Pulmonary:      Effort: Pulmonary effort is normal.      Breath sounds: Normal breath sounds.   Skin:     General: Skin is warm.      Capillary Refill: Capillary refill takes 2 to 3 seconds.      Findings: Erythema and rash present.      Comments: Left lower extremity swelling, 4+ pitting edema, erythema however this is improved since his visit to the ER yesterday, significant amount of serous fluid.  No purulent drainage noted   Neurological:      Mental Status: He is alert and oriented to person, place, and time.   Psychiatric:         Behavior: Behavior normal.       Reviewed labs and notes from hospital visits    Assessment/Plan:    1. Acute deep vein thrombosis (DVT) of left lower extremity, unspecified vein (HCC)  Acute uncomplicated condition.  At this time discussed with patient and family that reduce salt, increase electrolytes such as Pedialyte due to fluid loss, watch for chills, fevers, worsening redness or swelling.  Please  amoxicillin from pharmacy.  Do not wrap or compress leg at this time as it will further increase maceration of skin and cause open wounds.  Patient to follow-up in the office or emergency room " for worsening symptoms.   Patient to follow-up in 1 week       Discussed with patient possible alternative diagnoses, patient is to take all medications as prescribed.     If symptoms persist FU w/PCP, if symptoms worsen go to emergency room.     If experiencing any side effects from prescribed medications reports to the office immediately or go to emergency room.    Reviewed indication, dosage, usage and potential adverse effects of prescribed medications.     Reviewed risks and benefits of treatment plan. Patient verbalizes understanding of all instruction and verbally agrees to plan.      Return in about 1 week (around 9/22/2023) for Follow-up edema.      Please note that this dictation was created using voice recognition software. I have made every reasonable attempt to correct obvious errors, but I expect that there are errors of grammar and possibly content that I did not discover before finalizing the note.   upper/lower partial/full/upper/lower

## 2023-10-24 ENCOUNTER — EMERGENCY (EMERGENCY)
Facility: HOSPITAL | Age: 83
LOS: 1 days | Discharge: HOME | End: 2023-10-24
Attending: STUDENT IN AN ORGANIZED HEALTH CARE EDUCATION/TRAINING PROGRAM
Payer: MEDICARE

## 2023-10-24 VITALS
SYSTOLIC BLOOD PRESSURE: 141 MMHG | RESPIRATION RATE: 18 BRPM | HEART RATE: 69 BPM | TEMPERATURE: 98 F | DIASTOLIC BLOOD PRESSURE: 94 MMHG | OXYGEN SATURATION: 97 %

## 2023-10-24 PROBLEM — I10 ESSENTIAL (PRIMARY) HYPERTENSION: Chronic | Status: ACTIVE | Noted: 2023-07-08

## 2023-10-24 PROBLEM — E78.5 HYPERLIPIDEMIA, UNSPECIFIED: Chronic | Status: ACTIVE | Noted: 2023-07-08

## 2023-10-24 PROBLEM — H40.9 UNSPECIFIED GLAUCOMA: Chronic | Status: ACTIVE | Noted: 2023-07-08

## 2023-10-24 PROBLEM — E53.8 DEFICIENCY OF OTHER SPECIFIED B GROUP VITAMINS: Chronic | Status: ACTIVE | Noted: 2023-07-08

## 2023-10-24 PROBLEM — I25.10 ATHEROSCLEROTIC HEART DISEASE OF NATIVE CORONARY ARTERY WITHOUT ANGINA PECTORIS: Chronic | Status: ACTIVE | Noted: 2023-07-08

## 2023-10-24 PROBLEM — E11.9 TYPE 2 DIABETES MELLITUS WITHOUT COMPLICATIONS: Chronic | Status: ACTIVE | Noted: 2023-07-08

## 2023-10-24 PROBLEM — N40.0 BENIGN PROSTATIC HYPERPLASIA WITHOUT LOWER URINARY TRACT SYMPTOMS: Chronic | Status: ACTIVE | Noted: 2023-07-08

## 2023-10-24 LAB
ALBUMIN SERPL ELPH-MCNC: 3.2 G/DL — LOW (ref 3.5–5)
ALBUMIN SERPL ELPH-MCNC: 3.2 G/DL — LOW (ref 3.5–5)
ALP SERPL-CCNC: 89 U/L — SIGNIFICANT CHANGE UP (ref 40–120)
ALP SERPL-CCNC: 89 U/L — SIGNIFICANT CHANGE UP (ref 40–120)
ALT FLD-CCNC: 21 U/L DA — SIGNIFICANT CHANGE UP (ref 10–60)
ALT FLD-CCNC: 21 U/L DA — SIGNIFICANT CHANGE UP (ref 10–60)
ANION GAP SERPL CALC-SCNC: 2 MMOL/L — LOW (ref 5–17)
ANION GAP SERPL CALC-SCNC: 2 MMOL/L — LOW (ref 5–17)
APPEARANCE UR: CLEAR — SIGNIFICANT CHANGE UP
APPEARANCE UR: CLEAR — SIGNIFICANT CHANGE UP
APTT BLD: 31.3 SEC — SIGNIFICANT CHANGE UP (ref 24.5–35.6)
APTT BLD: 31.3 SEC — SIGNIFICANT CHANGE UP (ref 24.5–35.6)
AST SERPL-CCNC: 11 U/L — SIGNIFICANT CHANGE UP (ref 10–40)
AST SERPL-CCNC: 11 U/L — SIGNIFICANT CHANGE UP (ref 10–40)
BACTERIA # UR AUTO: ABNORMAL /HPF
BACTERIA # UR AUTO: ABNORMAL /HPF
BASOPHILS # BLD AUTO: 0.02 K/UL — SIGNIFICANT CHANGE UP (ref 0–0.2)
BASOPHILS # BLD AUTO: 0.02 K/UL — SIGNIFICANT CHANGE UP (ref 0–0.2)
BASOPHILS NFR BLD AUTO: 0.3 % — SIGNIFICANT CHANGE UP (ref 0–2)
BASOPHILS NFR BLD AUTO: 0.3 % — SIGNIFICANT CHANGE UP (ref 0–2)
BILIRUB SERPL-MCNC: 0.3 MG/DL — SIGNIFICANT CHANGE UP (ref 0.2–1.2)
BILIRUB SERPL-MCNC: 0.3 MG/DL — SIGNIFICANT CHANGE UP (ref 0.2–1.2)
BILIRUB UR-MCNC: NEGATIVE — SIGNIFICANT CHANGE UP
BILIRUB UR-MCNC: NEGATIVE — SIGNIFICANT CHANGE UP
BUN SERPL-MCNC: 26 MG/DL — HIGH (ref 7–18)
BUN SERPL-MCNC: 26 MG/DL — HIGH (ref 7–18)
CALCIUM SERPL-MCNC: 9.1 MG/DL — SIGNIFICANT CHANGE UP (ref 8.4–10.5)
CALCIUM SERPL-MCNC: 9.1 MG/DL — SIGNIFICANT CHANGE UP (ref 8.4–10.5)
CHLORIDE SERPL-SCNC: 97 MMOL/L — SIGNIFICANT CHANGE UP (ref 96–108)
CHLORIDE SERPL-SCNC: 97 MMOL/L — SIGNIFICANT CHANGE UP (ref 96–108)
CO2 SERPL-SCNC: 33 MMOL/L — HIGH (ref 22–31)
CO2 SERPL-SCNC: 33 MMOL/L — HIGH (ref 22–31)
COLOR SPEC: YELLOW — SIGNIFICANT CHANGE UP
COLOR SPEC: YELLOW — SIGNIFICANT CHANGE UP
CREAT SERPL-MCNC: 1.11 MG/DL — SIGNIFICANT CHANGE UP (ref 0.5–1.3)
CREAT SERPL-MCNC: 1.11 MG/DL — SIGNIFICANT CHANGE UP (ref 0.5–1.3)
DIFF PNL FLD: NEGATIVE — SIGNIFICANT CHANGE UP
DIFF PNL FLD: NEGATIVE — SIGNIFICANT CHANGE UP
EGFR: 65 ML/MIN/1.73M2 — SIGNIFICANT CHANGE UP
EGFR: 65 ML/MIN/1.73M2 — SIGNIFICANT CHANGE UP
EOSINOPHIL # BLD AUTO: 0.17 K/UL — SIGNIFICANT CHANGE UP (ref 0–0.5)
EOSINOPHIL # BLD AUTO: 0.17 K/UL — SIGNIFICANT CHANGE UP (ref 0–0.5)
EOSINOPHIL NFR BLD AUTO: 2.8 % — SIGNIFICANT CHANGE UP (ref 0–6)
EOSINOPHIL NFR BLD AUTO: 2.8 % — SIGNIFICANT CHANGE UP (ref 0–6)
GLUCOSE SERPL-MCNC: 126 MG/DL — HIGH (ref 70–99)
GLUCOSE SERPL-MCNC: 126 MG/DL — HIGH (ref 70–99)
GLUCOSE UR QL: NEGATIVE MG/DL — SIGNIFICANT CHANGE UP
GLUCOSE UR QL: NEGATIVE MG/DL — SIGNIFICANT CHANGE UP
HCT VFR BLD CALC: 33.4 % — LOW (ref 39–50)
HCT VFR BLD CALC: 33.4 % — LOW (ref 39–50)
HGB BLD-MCNC: 10.1 G/DL — LOW (ref 13–17)
HGB BLD-MCNC: 10.1 G/DL — LOW (ref 13–17)
IMM GRANULOCYTES NFR BLD AUTO: 0.3 % — SIGNIFICANT CHANGE UP (ref 0–0.9)
IMM GRANULOCYTES NFR BLD AUTO: 0.3 % — SIGNIFICANT CHANGE UP (ref 0–0.9)
INR BLD: 0.96 RATIO — SIGNIFICANT CHANGE UP (ref 0.85–1.18)
INR BLD: 0.96 RATIO — SIGNIFICANT CHANGE UP (ref 0.85–1.18)
KETONES UR-MCNC: NEGATIVE MG/DL — SIGNIFICANT CHANGE UP
KETONES UR-MCNC: NEGATIVE MG/DL — SIGNIFICANT CHANGE UP
LEUKOCYTE ESTERASE UR-ACNC: NEGATIVE — SIGNIFICANT CHANGE UP
LEUKOCYTE ESTERASE UR-ACNC: NEGATIVE — SIGNIFICANT CHANGE UP
LYMPHOCYTES # BLD AUTO: 1.78 K/UL — SIGNIFICANT CHANGE UP (ref 1–3.3)
LYMPHOCYTES # BLD AUTO: 1.78 K/UL — SIGNIFICANT CHANGE UP (ref 1–3.3)
LYMPHOCYTES # BLD AUTO: 29.5 % — SIGNIFICANT CHANGE UP (ref 13–44)
LYMPHOCYTES # BLD AUTO: 29.5 % — SIGNIFICANT CHANGE UP (ref 13–44)
MCHC RBC-ENTMCNC: 25.4 PG — LOW (ref 27–34)
MCHC RBC-ENTMCNC: 25.4 PG — LOW (ref 27–34)
MCHC RBC-ENTMCNC: 30.2 GM/DL — LOW (ref 32–36)
MCHC RBC-ENTMCNC: 30.2 GM/DL — LOW (ref 32–36)
MCV RBC AUTO: 83.9 FL — SIGNIFICANT CHANGE UP (ref 80–100)
MCV RBC AUTO: 83.9 FL — SIGNIFICANT CHANGE UP (ref 80–100)
MONOCYTES # BLD AUTO: 0.66 K/UL — SIGNIFICANT CHANGE UP (ref 0–0.9)
MONOCYTES # BLD AUTO: 0.66 K/UL — SIGNIFICANT CHANGE UP (ref 0–0.9)
MONOCYTES NFR BLD AUTO: 10.9 % — SIGNIFICANT CHANGE UP (ref 2–14)
MONOCYTES NFR BLD AUTO: 10.9 % — SIGNIFICANT CHANGE UP (ref 2–14)
NEUTROPHILS # BLD AUTO: 3.39 K/UL — SIGNIFICANT CHANGE UP (ref 1.8–7.4)
NEUTROPHILS # BLD AUTO: 3.39 K/UL — SIGNIFICANT CHANGE UP (ref 1.8–7.4)
NEUTROPHILS NFR BLD AUTO: 56.2 % — SIGNIFICANT CHANGE UP (ref 43–77)
NEUTROPHILS NFR BLD AUTO: 56.2 % — SIGNIFICANT CHANGE UP (ref 43–77)
NITRITE UR-MCNC: NEGATIVE — SIGNIFICANT CHANGE UP
NITRITE UR-MCNC: NEGATIVE — SIGNIFICANT CHANGE UP
NRBC # BLD: 0 /100 WBCS — SIGNIFICANT CHANGE UP (ref 0–0)
NRBC # BLD: 0 /100 WBCS — SIGNIFICANT CHANGE UP (ref 0–0)
PH UR: 5.5 — SIGNIFICANT CHANGE UP (ref 5–8)
PH UR: 5.5 — SIGNIFICANT CHANGE UP (ref 5–8)
PLATELET # BLD AUTO: 443 K/UL — HIGH (ref 150–400)
PLATELET # BLD AUTO: 443 K/UL — HIGH (ref 150–400)
POTASSIUM SERPL-MCNC: 4.5 MMOL/L — SIGNIFICANT CHANGE UP (ref 3.5–5.3)
POTASSIUM SERPL-MCNC: 4.5 MMOL/L — SIGNIFICANT CHANGE UP (ref 3.5–5.3)
POTASSIUM SERPL-SCNC: 4.5 MMOL/L — SIGNIFICANT CHANGE UP (ref 3.5–5.3)
POTASSIUM SERPL-SCNC: 4.5 MMOL/L — SIGNIFICANT CHANGE UP (ref 3.5–5.3)
PROT SERPL-MCNC: 7 G/DL — SIGNIFICANT CHANGE UP (ref 6–8.3)
PROT SERPL-MCNC: 7 G/DL — SIGNIFICANT CHANGE UP (ref 6–8.3)
PROT UR-MCNC: NEGATIVE MG/DL — SIGNIFICANT CHANGE UP
PROT UR-MCNC: NEGATIVE MG/DL — SIGNIFICANT CHANGE UP
PROTHROM AB SERPL-ACNC: 11 SEC — SIGNIFICANT CHANGE UP (ref 9.5–13)
PROTHROM AB SERPL-ACNC: 11 SEC — SIGNIFICANT CHANGE UP (ref 9.5–13)
RBC # BLD: 3.98 M/UL — LOW (ref 4.2–5.8)
RBC # BLD: 3.98 M/UL — LOW (ref 4.2–5.8)
RBC # FLD: 16.6 % — HIGH (ref 10.3–14.5)
RBC # FLD: 16.6 % — HIGH (ref 10.3–14.5)
RBC CASTS # UR COMP ASSIST: 1 /HPF — SIGNIFICANT CHANGE UP (ref 0–4)
RBC CASTS # UR COMP ASSIST: 1 /HPF — SIGNIFICANT CHANGE UP (ref 0–4)
SODIUM SERPL-SCNC: 132 MMOL/L — LOW (ref 135–145)
SODIUM SERPL-SCNC: 132 MMOL/L — LOW (ref 135–145)
SP GR SPEC: 1.01 — SIGNIFICANT CHANGE UP (ref 1–1.03)
SP GR SPEC: 1.01 — SIGNIFICANT CHANGE UP (ref 1–1.03)
UROBILINOGEN FLD QL: 1 MG/DL — SIGNIFICANT CHANGE UP (ref 0.2–1)
UROBILINOGEN FLD QL: 1 MG/DL — SIGNIFICANT CHANGE UP (ref 0.2–1)
WBC # BLD: 6.04 K/UL — SIGNIFICANT CHANGE UP (ref 3.8–10.5)
WBC # BLD: 6.04 K/UL — SIGNIFICANT CHANGE UP (ref 3.8–10.5)
WBC # FLD AUTO: 6.04 K/UL — SIGNIFICANT CHANGE UP (ref 3.8–10.5)
WBC # FLD AUTO: 6.04 K/UL — SIGNIFICANT CHANGE UP (ref 3.8–10.5)
WBC UR QL: 2 /HPF — SIGNIFICANT CHANGE UP (ref 0–5)
WBC UR QL: 2 /HPF — SIGNIFICANT CHANGE UP (ref 0–5)

## 2023-10-24 PROCEDURE — 74176 CT ABD & PELVIS W/O CONTRAST: CPT | Mod: 26,MA

## 2023-10-24 PROCEDURE — 81001 URINALYSIS AUTO W/SCOPE: CPT

## 2023-10-24 PROCEDURE — 99284 EMERGENCY DEPT VISIT MOD MDM: CPT | Mod: 25

## 2023-10-24 PROCEDURE — 74176 CT ABD & PELVIS W/O CONTRAST: CPT | Mod: MA

## 2023-10-24 PROCEDURE — 99284 EMERGENCY DEPT VISIT MOD MDM: CPT

## 2023-10-24 PROCEDURE — 36415 COLL VENOUS BLD VENIPUNCTURE: CPT

## 2023-10-24 PROCEDURE — 85025 COMPLETE CBC W/AUTO DIFF WBC: CPT

## 2023-10-24 PROCEDURE — 96374 THER/PROPH/DIAG INJ IV PUSH: CPT

## 2023-10-24 PROCEDURE — 87086 URINE CULTURE/COLONY COUNT: CPT

## 2023-10-24 PROCEDURE — 80053 COMPREHEN METABOLIC PANEL: CPT

## 2023-10-24 PROCEDURE — 85730 THROMBOPLASTIN TIME PARTIAL: CPT

## 2023-10-24 PROCEDURE — 85610 PROTHROMBIN TIME: CPT

## 2023-10-24 RX ORDER — ACETAMINOPHEN 500 MG
1000 TABLET ORAL ONCE
Refills: 0 | Status: COMPLETED | OUTPATIENT
Start: 2023-10-24 | End: 2023-10-24

## 2023-10-24 RX ORDER — LIDOCAINE 4 G/100G
1 CREAM TOPICAL ONCE
Refills: 0 | Status: COMPLETED | OUTPATIENT
Start: 2023-10-24 | End: 2023-10-24

## 2023-10-24 RX ADMIN — Medication 400 MILLIGRAM(S): at 15:56

## 2023-10-24 RX ADMIN — LIDOCAINE 1 PATCH: 4 CREAM TOPICAL at 15:56

## 2023-10-24 NOTE — ED PROVIDER NOTE - PHYSICAL EXAMINATION
CONSTITUTIONAL: non-toxic, well appearing  SKIN: no rash, no petechiae.  EYES: pink conjunctiva, anicteric  ENT: tongue and uvular midline, no exudates, moist mucous membranes  NECK: Supple; no meningismus, no JVD  CARD: RRR, no murmurs, equal radial pulses bilaterally 2+  RESP: CTAB, no respiratory distress  ABD: Soft, non-tender, mildly distended, no peritoneal signs, no CVA tenderness  SPINE: no midline bony tenderness  EXT: Normal ROM x4. No edema.   NEURO: Alert, oriented. Neuro exam nonfocal. FROM hips and BLE. Sensation grossly itnact.   PSYCH: Cooperative, appropriate.

## 2023-10-24 NOTE — ED PROVIDER NOTE - PROGRESS NOTE DETAILS
Pt unable to bear weight with assistance. Will admit for ambulatory dysfunction. For now cannot return to assisted living. Pt able to bear weight with assistance. Awaiting return to assisted living.

## 2023-10-24 NOTE — ED PROVIDER NOTE - PATIENT PORTAL LINK FT
You can access the FollowMyHealth Patient Portal offered by [facility] by registering at the following website: [email or web address] By joining [email or web address]’s FollowMyHealth portal, you will also be able to view your health information using other applications (apps) compatible with our system.

## 2023-10-24 NOTE — ED ADULT NURSE NOTE - NSFALLRISKINTERV_ED_ALL_ED

## 2023-10-24 NOTE — ED PROVIDER NOTE - OBJECTIVE STATEMENT
83-year-old male with past medical history CAD, hyperlipidemia, diabetes, BPD presents with low back pain since yesterday.  Patient reports pain "across his low back" since yesterday.  Denies any recent injury or trauma.  Patient states he has been able to walk with use of walker.  Denies any fevers, chest pain, shortness of breath, abdominal pain, vomiting, diarrhea, dysuria, numbness, weakness, bowel/bladder dysfunction, saddle anesthesia, or rash.  Per nursing facility paperwork, no anticoagulation use.  Denies any additional complaints.

## 2023-10-24 NOTE — ED ADULT NURSE NOTE - OBJECTIVE STATEMENT
Patient presented ED from [facility], c/o non-radiating lower back pain x3 days. Pt denies any recent injuries or falls. Pt states to utilize a rollator and cane at home (Facility).

## 2023-10-24 NOTE — ED PROVIDER NOTE - CLINICAL SUMMARY MEDICAL DECISION MAKING FREE TEXT BOX
Yuan: 83-year-old male with past medical history CAD, hyperlipidemia, diabetes, BPD presents with low back pain since yesterday.  Patient reports pain "across his low back" since yesterday.  Denies any recent injury or trauma.  Patient states he has been able to walk with use of walker.  Denies any fevers, chest pain, shortness of breath, abdominal pain, vomiting, diarrhea, dysuria, numbness, weakness, bowel/bladder dysfunction, saddle anesthesia, or rash.  Per nursing facility paperwork, no anticoagulation use. Physical exam per above. Pain not clearly MSK, not reproducible with movement/position change. Abdomen nontender. Plan includes labs, imaging, provide supportive treatment with dispo pending workup.

## 2023-10-24 NOTE — ED PROVIDER NOTE - CARE PLAN
Principal Discharge DX:	Ambulatory dysfunction  Secondary Diagnosis:	Lower back pain   1 Principal Discharge DX:	Back pain  Secondary Diagnosis:	Lower back pain

## 2023-10-25 VITALS
TEMPERATURE: 99 F | RESPIRATION RATE: 18 BRPM | SYSTOLIC BLOOD PRESSURE: 112 MMHG | DIASTOLIC BLOOD PRESSURE: 79 MMHG | OXYGEN SATURATION: 95 % | HEART RATE: 65 BPM

## 2023-10-25 LAB
CULTURE RESULTS: SIGNIFICANT CHANGE UP
CULTURE RESULTS: SIGNIFICANT CHANGE UP
SPECIMEN SOURCE: SIGNIFICANT CHANGE UP
SPECIMEN SOURCE: SIGNIFICANT CHANGE UP

## 2024-03-15 NOTE — PHYSICAL THERAPY INITIAL EVALUATION ADULT - ASSISTIVE DEVICE:SIT/SUPINE, REHAB EVAL
[de-identified] : Lumbar ROM: NTTP L spine and b/l paraspinals lumbar region Skin intact L spine. No rashes, ulcers, blisters.  No lymphedema.  Rapid alternating movements- intact Finger to nose testing- intact Full and non-painful ROM RUE, LUE, RLE and LLE. Skin intact RUE, LUE, RLE and LLE. No rashes, blisters, ulcers. NTTP RUE, LUE, RLE and LLE. No evidence of dislocation or subluxation B/L upper and lower extremities.  [de-identified] : 3 views AP and Lat, flex/ext of Lumbar Spine from [id number] . Read and dictated by Dr. Wojciech Walter Board Certified Orthopaedic surgeon [id number]   bed rails

## 2025-03-20 ENCOUNTER — OUTPATIENT (OUTPATIENT)
Dept: OUTPATIENT SERVICES | Facility: HOSPITAL | Age: 85
LOS: 1 days | End: 2025-03-20
Payer: MEDICARE

## 2025-03-20 ENCOUNTER — APPOINTMENT (OUTPATIENT)
Dept: SPEECH THERAPY | Facility: HOSPITAL | Age: 85
End: 2025-03-20

## 2025-03-20 ENCOUNTER — APPOINTMENT (OUTPATIENT)
Dept: RADIOLOGY | Facility: HOSPITAL | Age: 85
End: 2025-03-20

## 2025-03-20 DIAGNOSIS — R13.10 DYSPHAGIA, UNSPECIFIED: ICD-10-CM

## 2025-03-20 PROCEDURE — 74230 X-RAY XM SWLNG FUNCJ C+: CPT | Mod: 26

## (undated) DEVICE — CATH ELCTR GLIDE PRB 7FR

## (undated) DEVICE — Device

## (undated) DEVICE — SENSOR O2 FINGER ADULT

## (undated) DEVICE — TUBING MEDI-VAC W MAXIGRIP CONNECTORS 1/4"X6'

## (undated) DEVICE — LUBRICATING JELLY ONESHOT 1.25OZ

## (undated) DEVICE — SYR LUER LOK 50CC

## (undated) DEVICE — SOLIDIFIER ISOLYZER 2000 CC

## (undated) DEVICE — CONTAINER FORMALIN 10% 20ML

## (undated) DEVICE — BITE BLOCK ADULT 20 X 27MM (GREEN)

## (undated) DEVICE — CLAMP BX HOT RAD JAW 3

## (undated) DEVICE — SNARE CAPTIVATOR RND COLD STIFF 2.4X10MM 240CM

## (undated) DEVICE — ADAPTER ENDO CHNL SINGLE USE

## (undated) DEVICE — FORCEP BIOPSY 2.5MM DISP

## (undated) DEVICE — TUBING IV SET GRAVITY 3Y 100" MACRO

## (undated) DEVICE — MASK OXYGEN PANORAMIC

## (undated) DEVICE — TUBING CANNULA SALTER LABS NASAL ADULT 7FT

## (undated) DEVICE — RETRIEVER ROTH NET PLATINUM-UNIVERSAL

## (undated) DEVICE — NDL INJ SCLERO INTERJECT 23G

## (undated) DEVICE — KIT ENDO PROCEDURE CUST W/VLV

## (undated) DEVICE — VENODYNE/SCD SLEEVE CALF MEDIUM

## (undated) DEVICE — STERIS DEFENDO 3-PIECE KIT (AIR/WATER, SUCTION & BIOPSY VALVES)

## (undated) DEVICE — DRSG CURITY GAUZE SPONGE 4 X 4" 12-PLY

## (undated) DEVICE — SOL INJ NS 0.9% 500ML 1-PORT

## (undated) DEVICE — SNARE LOOP POLY DISP 30MM LOOP